# Patient Record
Sex: MALE | Race: WHITE | Employment: FULL TIME | URBAN - METROPOLITAN AREA
[De-identification: names, ages, dates, MRNs, and addresses within clinical notes are randomized per-mention and may not be internally consistent; named-entity substitution may affect disease eponyms.]

---

## 2020-08-19 ENCOUNTER — HOSPITAL ENCOUNTER (OUTPATIENT)
Age: 40
Discharge: HOME | End: 2020-08-19
Attending: EMERGENCY MEDICINE
Payer: COMMERCIAL

## 2020-08-19 VITALS
OXYGEN SATURATION: 98 % | HEART RATE: 59 BPM | SYSTOLIC BLOOD PRESSURE: 140 MMHG | HEIGHT: 67 IN | TEMPERATURE: 99 F | DIASTOLIC BLOOD PRESSURE: 80 MMHG | BODY MASS INDEX: 36.1 KG/M2 | RESPIRATION RATE: 18 BRPM | WEIGHT: 230 LBS

## 2020-08-19 DIAGNOSIS — S61.211A LACERATION OF LEFT INDEX FINGER WITHOUT FOREIGN BODY WITHOUT DAMAGE TO NAIL, INITIAL ENCOUNTER: Primary | ICD-10-CM

## 2020-08-19 PROCEDURE — 99202 OFFICE O/P NEW SF 15 MIN: CPT | Performed by: EMERGENCY MEDICINE

## 2020-08-19 PROCEDURE — 90471 IMMUNIZATION ADMIN: CPT | Performed by: EMERGENCY MEDICINE

## 2020-08-19 PROCEDURE — 12001 RPR S/N/AX/GEN/TRNK 2.5CM/<: CPT | Performed by: EMERGENCY MEDICINE

## 2020-08-19 PROCEDURE — 90715 TDAP VACCINE 7 YRS/> IM: CPT | Performed by: EMERGENCY MEDICINE

## 2020-08-20 NOTE — ED PROVIDER NOTES
Patient Seen in: [facility] [place]    History   No chief complaint on file.     Stated Complaint: Cut on L Index Finger    HPI    HPI: Demi Ford is a 38year old male who presents after an injury to left 2nd finger tip  t °C)   Temp src Temporal   SpO2 98 %   O2 Device None (Room air)       Current:/80   Pulse 59   Temp 98.5 °F (36.9 °C) (Temporal)   Resp 18   Ht 170.2 cm (5' 7\")   Wt 104.3 kg   SpO2 98%   BMI 36.02 kg/m²         Physical Exam      MENTAL STATUS: Lucy

## 2021-04-09 DIAGNOSIS — Z23 NEED FOR VACCINATION: ICD-10-CM

## 2021-08-09 ENCOUNTER — HOSPITAL ENCOUNTER (OUTPATIENT)
Age: 41
Discharge: HOME | End: 2021-08-09
Payer: COMMERCIAL

## 2021-08-09 ENCOUNTER — APPOINTMENT (OUTPATIENT)
Dept: GENERAL RADIOLOGY | Age: 41
End: 2021-08-09
Attending: NURSE PRACTITIONER
Payer: COMMERCIAL

## 2021-08-09 VITALS
RESPIRATION RATE: 16 BRPM | OXYGEN SATURATION: 100 % | HEART RATE: 58 BPM | DIASTOLIC BLOOD PRESSURE: 75 MMHG | TEMPERATURE: 98 F | WEIGHT: 235 LBS | HEIGHT: 67 IN | BODY MASS INDEX: 36.88 KG/M2 | SYSTOLIC BLOOD PRESSURE: 147 MMHG

## 2021-08-09 DIAGNOSIS — S99.921A INJURY OF RIGHT FOOT, INITIAL ENCOUNTER: Primary | ICD-10-CM

## 2021-08-09 DIAGNOSIS — S90.31XA CONTUSION OF RIGHT FOOT, INITIAL ENCOUNTER: ICD-10-CM

## 2021-08-09 PROCEDURE — 99213 OFFICE O/P EST LOW 20 MIN: CPT | Performed by: NURSE PRACTITIONER

## 2021-08-09 PROCEDURE — L3260 AMBULATORY SURGICAL BOOT EAC: HCPCS | Performed by: NURSE PRACTITIONER

## 2021-08-09 PROCEDURE — 73630 X-RAY EXAM OF FOOT: CPT | Performed by: NURSE PRACTITIONER

## 2021-08-09 NOTE — ED PROVIDER NOTES
Patient Seen in: [facility] [place]      History   Patient presents with:  Fracture, Minor: Missed a step and fell. Right foot is injured. There is bruising by the toes and bruising on the outside of the foot.  Hurts when walking. - Entered by patient Temp 97.9 °F (36.6 °C)   Temp src Temporal   SpO2 100 %   O2 Device None (Room air)       Current:/75   Pulse 58   Temp 97.9 °F (36.6 °C) (Temporal)   Resp 16   Ht 170.2 cm (5' 7\")   Wt 106.6 kg   SpO2 100%   BMI 36.81 kg/m²         Physical Exam  V discharge home to follow-up with the podiatrist.    X-ray negative. Patient and spouse made aware of results. Discussed application of postop shoe, rice therapy and over-the-counter ibuprofen and Tylenol for pain.   Discussed following up with the podiatr

## 2023-01-11 ENCOUNTER — TELEPHONE (OUTPATIENT)
Dept: OTHER | Age: 43
End: 2023-01-11

## 2023-01-11 NOTE — TELEPHONE ENCOUNTER
Reason for Call/Symptoms: Elevated blood pressure  Onset: a few months  Courtesy Assessment: Patient dealt with high blood pressure years ago. He had weight loss surgery and his b/p went to normal. He started on Ritalin 2 months ago and the last two visits, his blood pressure has been elevated 150/90 in December and 140/90 yesterday. He is asymptomatic. Level of care recommendation: Appt in Office - Same Day  Advice given to patient: Scheduled for tomorrow.      Future Appointments   Date Time Provider Ashok Jackman   1/12/2023 11:00 [facility]   1/20/2023  3:15 PM Ethan Faulkner MD [facility]

## 2023-01-12 ENCOUNTER — PATIENT MESSAGE (OUTPATIENT)
Dept: FAMILY MEDICINE CLINIC | Facility: CLINIC | Age: 43
End: 2023-01-12

## 2023-01-12 ENCOUNTER — OFFICE VISIT (OUTPATIENT)
Dept: FAMILY MEDICINE CLINIC | Facility: CLINIC | Age: 43
End: 2023-01-12

## 2023-01-12 VITALS
DIASTOLIC BLOOD PRESSURE: 82 MMHG | HEIGHT: 67 IN | HEART RATE: 70 BPM | BODY MASS INDEX: 33.59 KG/M2 | WEIGHT: 214 LBS | SYSTOLIC BLOOD PRESSURE: 106 MMHG

## 2023-01-12 DIAGNOSIS — E66.9 OBESITY (BMI 30.0-34.9): ICD-10-CM

## 2023-01-12 DIAGNOSIS — L71.9 ROSACEA: ICD-10-CM

## 2023-01-12 DIAGNOSIS — Z98.84 HISTORY OF WEIGHT LOSS SURGERY: ICD-10-CM

## 2023-01-12 DIAGNOSIS — F98.8 ATTENTION DEFICIT DISORDER (ADD) WITHOUT HYPERACTIVITY: ICD-10-CM

## 2023-01-12 DIAGNOSIS — Z00.00 WELL ADULT EXAM: Primary | ICD-10-CM

## 2023-01-12 DIAGNOSIS — I99.8 FLUCTUATING BLOOD PRESSURE: ICD-10-CM

## 2023-01-12 PROCEDURE — 3074F SYST BP LT 130 MM HG: CPT | Performed by: NURSE PRACTITIONER

## 2023-01-12 PROCEDURE — 3079F DIAST BP 80-89 MM HG: CPT | Performed by: NURSE PRACTITIONER

## 2023-01-12 PROCEDURE — 99203 OFFICE O/P NEW LOW 30 MIN: CPT | Performed by: NURSE PRACTITIONER

## 2023-01-12 PROCEDURE — 3008F BODY MASS INDEX DOCD: CPT | Performed by: NURSE PRACTITIONER

## 2023-01-12 PROCEDURE — 99386 PREV VISIT NEW AGE 40-64: CPT | Performed by: NURSE PRACTITIONER

## 2023-01-12 RX ORDER — DOXYCYCLINE 40 MG/1
CAPSULE ORAL
COMMUNITY

## 2023-01-12 RX ORDER — BLOOD PRESSURE TEST KIT
KIT MISCELLANEOUS
Qty: 1 KIT | Refills: 0 | Status: SHIPPED | OUTPATIENT
Start: 2023-01-12

## 2023-01-12 RX ORDER — METHYLPHENIDATE HYDROCHLORIDE 20 MG/1
TABLET ORAL
COMMUNITY
Start: 2022-10-01

## 2023-01-12 NOTE — ASSESSMENT & PLAN NOTE
Please aim to eat a diet high in fresh fruits and vegetables, lean protein sources, complex carbohydrates and limited processed and fast foods. Try to get at least 150 minutes of exercise per week-a combination of weight resistance and cardio is preferred.     Has h/o duodenal switch sx

## 2023-01-12 NOTE — ASSESSMENT & PLAN NOTE
Sees psychiatry  Is on ritalin 20 mg twice a day-they are looking to raise to 30 mg  Needs to monitor bp twice a day for the next 2 weeks and send in copy of results

## 2023-01-12 NOTE — ASSESSMENT & PLAN NOTE
Discussed w pt the need to take vitamins as prescribed by weight loss center  Should be seen at least yearly in follow up   Screening labs ordered

## 2023-01-13 NOTE — ASSESSMENT & PLAN NOTE
Screening labs ordered  Please aim to eat a diet high in fresh fruits and vegetables, lean protein sources, complex carbohydrates and limited processed and fast foods. Try to get at least 150 minutes of exercise per week-a combination of weight resistance and cardio is preferred.     Pt declines flu shot

## 2023-01-19 LAB
% SATURATION: 19 % (CALC) (ref 20–48)
ALBUMIN/GLOBULIN RATIO: 1.7 (CALC) (ref 1–2.5)
ALBUMIN: 4.1 G/DL (ref 3.6–5.1)
ALKALINE PHOSPHATASE: 144 U/L (ref 36–130)
ALT: 91 U/L (ref 9–46)
AST: 70 U/L (ref 10–40)
BILIRUBIN, TOTAL: 0.9 MG/DL (ref 0.2–1.2)
BUN: 17 MG/DL (ref 7–25)
CALCIUM: 9.1 MG/DL (ref 8.6–10.3)
CARBON DIOXIDE: 32 MMOL/L (ref 20–32)
CHLORIDE: 104 MMOL/L (ref 98–110)
CHOL/HDLC RATIO: 2.1 (CALC)
CHOLESTEROL, TOTAL: 94 MG/DL
CREATININE: 0.84 MG/DL (ref 0.6–1.29)
EGFR: 111 ML/MIN/1.73M2
FERRITIN: 25 NG/ML (ref 38–380)
GLOBULIN: 2.4 G/DL (CALC) (ref 1.9–3.7)
GLUCOSE: 80 MG/DL (ref 65–99)
HDL CHOLESTEROL: 45 MG/DL
HEMATOCRIT: 44.5 % (ref 38.5–50)
HEMOGLOBIN A1C: 4.4 % OF TOTAL HGB
HEMOGLOBIN: 14.7 G/DL (ref 13.2–17.1)
IRON BINDING CAPACITY: 320 MCG/DL (CALC) (ref 250–425)
IRON, TOTAL: 62 MCG/DL (ref 50–180)
LDL-CHOLESTEROL: 33 MG/DL (CALC)
MAGNESIUM: 2.1 MG/DL (ref 1.5–2.5)
MCH: 29 PG (ref 27–33)
MCHC: 33 G/DL (ref 32–36)
MCV: 87.8 FL (ref 80–100)
MPV: 12.7 FL (ref 7.5–12.5)
NON-HDL CHOLESTEROL: 49 MG/DL (CALC)
PLATELET COUNT: 163 THOUSAND/UL (ref 140–400)
POTASSIUM: 5 MMOL/L (ref 3.5–5.3)
PROTEIN, TOTAL: 6.5 G/DL (ref 6.1–8.1)
RDW: 12.9 % (ref 11–15)
RED BLOOD CELL COUNT: 5.07 MILLION/UL (ref 4.2–5.8)
SODIUM: 139 MMOL/L (ref 135–146)
TRIGLYCERIDES: 75 MG/DL
TSH W/REFLEX TO FT4: 3.71 MIU/L (ref 0.4–4.5)
VITAMIN B12: 1572 PG/ML (ref 200–1100)
VITAMIN B6: 14.1 NG/ML (ref 2.1–21.7)
VITAMIN D, 25-OH, TOTAL: 18 NG/ML (ref 30–100)
WHITE BLOOD CELL COUNT: 5.9 THOUSAND/UL (ref 3.8–10.8)

## 2023-01-22 DIAGNOSIS — R74.8 ELEVATED LIVER ENZYMES: Primary | ICD-10-CM

## 2023-01-28 ENCOUNTER — PATIENT MESSAGE (OUTPATIENT)
Dept: FAMILY MEDICINE CLINIC | Facility: CLINIC | Age: 43
End: 2023-01-28

## 2023-01-28 NOTE — TELEPHONE ENCOUNTER
Collin Hubbard RN 1/28/2023 10:14 AM CST        ----- Message -----  From: Rita Elena  Sent: 1/28/2023 8:16 AM CST  To: [facility] Rn Triage  Subject: Blood Pressure Readings     At my previous appointment, you wanted me to keep track of my blood pressure readings. Attached is a pdf breakout. Let me know if I need to schedule a follow up appointment.

## 2023-05-18 ENCOUNTER — HOSPITAL ENCOUNTER (OUTPATIENT)
Age: 43
Discharge: HOME | End: 2023-05-18
Payer: COMMERCIAL

## 2023-05-18 ENCOUNTER — APPOINTMENT (OUTPATIENT)
Dept: GENERAL RADIOLOGY | Age: 43
End: 2023-05-18
Payer: COMMERCIAL

## 2023-05-18 VITALS
RESPIRATION RATE: 16 BRPM | HEART RATE: 67 BPM | TEMPERATURE: 98 F | SYSTOLIC BLOOD PRESSURE: 132 MMHG | DIASTOLIC BLOOD PRESSURE: 78 MMHG | OXYGEN SATURATION: 100 %

## 2023-05-18 DIAGNOSIS — M79.641 RIGHT HAND PAIN: Primary | ICD-10-CM

## 2023-05-18 DIAGNOSIS — S63.91XA SPRAIN OF RIGHT HAND, INITIAL ENCOUNTER: ICD-10-CM

## 2023-05-18 PROCEDURE — 73130 X-RAY EXAM OF HAND: CPT

## 2023-05-18 NOTE — DISCHARGE INSTRUCTIONS
There is no fracture on your x-ray. You likely have a sprain of your hand. Rest, ice and elevate. Take Tylenol and Motrin for pain as needed. If you develop any numbness, tingling, acutely worsening pain, swelling or any other concerning complaints you should go to the emergency department. If you have persistent pain for more than the next week make an appointment to see the orthopedic hand specialist.  Otherwise follow-up with your primary care doctor. Your blood pressure was elevated today. You should monitor your blood pressure at home and keep a journal of your readings. Please be sure to make an appointment to follow-up with your primary care doctor to discuss this further.

## 2023-05-18 NOTE — ED INITIAL ASSESSMENT (HPI)
Pt in [address] [place] [address] for c/o R hand pain/swelling to dorsum of hand after punching wall this morning.

## 2023-12-14 ENCOUNTER — OFFICE VISIT (OUTPATIENT)
Dept: FAMILY MEDICINE CLINIC | Facility: CLINIC | Age: 43
End: 2023-12-14
Payer: COMMERCIAL

## 2023-12-14 VITALS
DIASTOLIC BLOOD PRESSURE: 82 MMHG | RESPIRATION RATE: 18 BRPM | SYSTOLIC BLOOD PRESSURE: 134 MMHG | OXYGEN SATURATION: 98 % | HEART RATE: 59 BPM | TEMPERATURE: 99 F

## 2023-12-14 DIAGNOSIS — J02.9 SORE THROAT: Primary | ICD-10-CM

## 2023-12-14 DIAGNOSIS — Z11.52 ENCOUNTER FOR SCREENING LABORATORY TESTING FOR COVID-19 VIRUS: ICD-10-CM

## 2023-12-14 LAB
CONTROL LINE PRESENT WITH A CLEAR BACKGROUND (YES/NO): YES YES/NO
KIT LOT #: NORMAL NUMERIC
OPERATOR ID: NORMAL
POCT LOT NUMBER: NORMAL
RAPID SARS-COV-2 BY PCR: NOT DETECTED

## 2023-12-14 PROCEDURE — 99213 OFFICE O/P EST LOW 20 MIN: CPT | Performed by: NURSE PRACTITIONER

## 2023-12-14 PROCEDURE — 3079F DIAST BP 80-89 MM HG: CPT | Performed by: NURSE PRACTITIONER

## 2023-12-14 PROCEDURE — U0002 COVID-19 LAB TEST NON-CDC: HCPCS | Performed by: NURSE PRACTITIONER

## 2023-12-14 PROCEDURE — 87880 STREP A ASSAY W/OPTIC: CPT | Performed by: NURSE PRACTITIONER

## 2023-12-14 PROCEDURE — 3075F SYST BP GE 130 - 139MM HG: CPT | Performed by: NURSE PRACTITIONER

## 2024-03-20 ENCOUNTER — HOSPITAL ENCOUNTER (OUTPATIENT)
Dept: GENERAL RADIOLOGY | Age: 44
Discharge: HOME | End: 2024-03-20
Attending: NURSE PRACTITIONER
Payer: COMMERCIAL

## 2024-03-20 ENCOUNTER — OFFICE VISIT (OUTPATIENT)
Dept: FAMILY MEDICINE CLINIC | Facility: CLINIC | Age: 44
End: 2024-03-20
Payer: COMMERCIAL

## 2024-03-20 VITALS
DIASTOLIC BLOOD PRESSURE: 88 MMHG | HEART RATE: 52 BPM | SYSTOLIC BLOOD PRESSURE: 131 MMHG | WEIGHT: 195 LBS | BODY MASS INDEX: 31 KG/M2

## 2024-03-20 DIAGNOSIS — M16.0 OSTEOARTHRITIS OF BOTH HIPS, UNSPECIFIED OSTEOARTHRITIS TYPE: Primary | ICD-10-CM

## 2024-03-20 DIAGNOSIS — M25.551 RIGHT HIP PAIN: ICD-10-CM

## 2024-03-20 DIAGNOSIS — M25.551 RIGHT HIP PAIN: Primary | ICD-10-CM

## 2024-03-20 PROBLEM — Z98.84 H/O GASTRIC BYPASS: Status: ACTIVE | Noted: 2023-01-12

## 2024-03-20 PROCEDURE — 99214 OFFICE O/P EST MOD 30 MIN: CPT | Performed by: NURSE PRACTITIONER

## 2024-03-20 PROCEDURE — 3079F DIAST BP 80-89 MM HG: CPT | Performed by: NURSE PRACTITIONER

## 2024-03-20 PROCEDURE — 73502 X-RAY EXAM HIP UNI 2-3 VIEWS: CPT | Performed by: NURSE PRACTITIONER

## 2024-03-20 PROCEDURE — 3075F SYST BP GE 130 - 139MM HG: CPT | Performed by: NURSE PRACTITIONER

## 2024-03-20 NOTE — ASSESSMENT & PLAN NOTE
Tylenol as directed for pain  Ice 20 min 4-6 times per day  Do not use heat on injury  Do not apply ice directly on skin, make certain a thin cloth is between skin and ice pack    Right hip xray  Please call if symptoms worsen or are not resolving.

## 2024-03-20 NOTE — PROGRESS NOTES
Hip Pain         Pt here for increasing right hip pain-not certain as to when pain started. Thinks it started a couple of months ago.   Has been walking w his new puppy and be active everyday.  Has not pain w running. Pain worsens once he sits down and then has to get up.   Hurts when he gets out bed. Has stabbing pain to right lateral buttocks. Once he is up and moving it gets better. Ibuprofen will help pain but tries not to take every day.     Checks bp 121/81 is avg.   Review of Systems   Constitutional:  Positive for activity change.   Musculoskeletal:  Positive for arthralgias and gait problem.       Vitals:    03/20/24 1149 03/20/24 1200   BP: (!) 135/93 131/88   Pulse: 52    Weight: 195 lb (88.5 kg)      Body mass index is 30.54 kg/m².  Wt Readings from Last 6 Encounters:   03/20/24 195 lb (88.5 kg)   01/12/23 214 lb (97.1 kg)   08/09/21 235 lb (106.6 kg)   08/19/20 230 lb (104.3 kg)   08/15/18 240 lb 4.8 oz (109 kg)   11/19/15 231 lb 6.4 oz (105 kg)         Health Maintenance   Topic Date Due    Colorectal Cancer Screening  Never done    COVID-19 Vaccine (1 - 2023-24 season) Never done    Influenza Vaccine (1) 10/01/2023    Annual Depression Screening  Never done    Annual Physical  01/12/2024    DTaP,Tdap,and Td Vaccines (2 - Td or Tdap) 08/19/2030    Pneumococcal Vaccine: Birth to 64yrs  Aged Out       Past Medical History:   Diagnosis Date    Attention deficit disorder without mention of hyperactivity     Calculus of gallbladder without mention of cholecystitis or obstruction     History of bariatric surgery     Hyperglycemia 1/20/2011    on fasting bloodwork    Hypertriglyceridemia 4/29/2011    Lipid screening 12/16/2010    Morbid obesity (HCC) 2011    Rosacea 2006    Unspecified essential hypertension        .  Past Surgical History:   Procedure Laterality Date    Cholecystectomy      Gastroplasty duodenal switch      Hand/finger surgery unlisted  01/01/1994    Laparoscopic cholecystectomy  02/14/2008     Middle ear surgery proc unlisted  as a child    tubes in ears-    Tonsillectomy  01/01/1988    w/denys       Family History   Problem Relation Age of Onset    Other (HTN[other]) Mother     Hypertension Mother     Heart Attack Maternal Grandmother        Social History     Socioeconomic History    Marital status:      Spouse name: Not on file    Number of children: Not on file    Years of education: Not on file    Highest education level: Not on file   Occupational History    Not on file   Tobacco Use    Smoking status: Never    Smokeless tobacco: Never   Vaping Use    Vaping Use: Never used   Substance and Sexual Activity    Alcohol use: No     Alcohol/week: 0.0 standard drinks of alcohol    Drug use: No    Sexual activity: Not on file   Other Topics Concern     Service Not Asked    Blood Transfusions Not Asked    Caffeine Concern Yes     Comment: Coffee; Eneregy drinks    Occupational Exposure Not Asked    Hobby Hazards Not Asked    Sleep Concern Not Asked    Stress Concern Not Asked    Weight Concern Not Asked    Special Diet Not Asked    Back Care Not Asked    Exercise Yes     Comment: walks    Bike Helmet Not Asked    Seat Belt Not Asked    Self-Exams Not Asked   Social History Narrative    Not on file     Social Determinants of Health     Financial Resource Strain: Not on file   Food Insecurity: Not on file   Transportation Needs: Not on file   Physical Activity: Not on file   Stress: Not on file   Social Connections: Not on file   Housing Stability: Not on file       Current Outpatient Medications   Medication Sig Dispense Refill    methylphenidate 20 MG Oral Tab          Allergies:  No Known Allergies    Physical Exam  Vitals and nursing note reviewed.   Constitutional:       General: He is not in acute distress.     Appearance: Normal appearance. He is normal weight.   Cardiovascular:      Rate and Rhythm: Normal rate and regular rhythm.      Heart sounds: Normal heart sounds.    Pulmonary:      Effort: Pulmonary effort is normal. No respiratory distress.      Breath sounds: Normal breath sounds.   Musculoskeletal:         General: Tenderness present.      Right hip: Tenderness and bony tenderness present. Decreased range of motion.        Legs:    Skin:     General: Skin is warm and dry.   Neurological:      Mental Status: He is alert.         Assessment and Plan:   Problem List Items Addressed This Visit       Right hip pain - Primary     Tylenol as directed for pain  Ice 20 min 4-6 times per day  Do not use heat on injury  Do not apply ice directly on skin, make certain a thin cloth is between skin and ice pack    Right hip xray  Please call if symptoms worsen or are not resolving.           Relevant Orders    XR HIP + PELVIS MIN 4 VIEWS RIGHT (CPT=73503)               Discussed plan of care with pt and pt is in agreement.All questions answered. Pt to call with questions or concerns.      Encouraged to sign up for My Chart if not already registered.

## 2024-04-17 ENCOUNTER — OFFICE VISIT (OUTPATIENT)
Dept: ORTHOPEDICS CLINIC | Facility: CLINIC | Age: 44
End: 2024-04-17
Payer: COMMERCIAL

## 2024-04-17 DIAGNOSIS — M16.11 PRIMARY OSTEOARTHRITIS OF RIGHT HIP: Primary | ICD-10-CM

## 2024-04-17 PROCEDURE — 99244 OFF/OP CNSLTJ NEW/EST MOD 40: CPT | Performed by: ORTHOPAEDIC SURGERY

## 2024-04-18 NOTE — PROGRESS NOTES
NURSING INTAKE COMMENTS:   Chief Complaint   Patient presents with    Hip Pain     Consult - Referred by Aishwarya Mendiola APRN for Right hip pain. Patient states that he has been having this pain for a couple of years. The pain started to get worst about 6 months ago. Last X-Rays were done on 3/20. Patient rates his pain 7/10 at this time.           HPI: This 44 year old male presents today with complaints of right hip and groin pain.  He feels pain mainly over the lateral aspect of the hip.  He is had progressive symptoms over the past 6 to 8 months.  He is had minor symptoms in the hip for the past few years.  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  He has been taking Advil and using IcyHot patches with minimal improvement.  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  There is been no history of injury to the hip.  Pain is worse when walking distances.  He is had a considerable weight loss.  At 1 point he weighed 400 pounds and currently weighs 190 pounds.  He had weight loss surgery.  He reports no mechanical clicking or popping in the hip.  There is no significant back pain or numbness in the extremity.  He has occasional knee pain.    Past Medical History:    Attention deficit disorder without mention of hyperactivity    Calculus of gallbladder without mention of cholecystitis or obstruction    History of bariatric surgery    Hyperglycemia    on fasting bloodwork    Hypertriglyceridemia    Lipid screening    Morbid obesity (HCC)    Rosacea    Unspecified essential hypertension     Past Surgical History:   Procedure Laterality Date    Cholecystectomy      Gastroplasty duodenal switch      Hand/finger surgery unlisted  01/01/1994    Laparoscopic cholecystectomy  02/14/2008    Middle ear surgery proc unlisted  as a child    tubes in ears-    Tonsillectomy  01/01/1988    w/adnoids     Current Outpatient Medications   Medication Sig Dispense Refill    ORACEA 40 MG Oral Capsule Delayed  Release Take 1 capsule by mouth daily.      methylphenidate 20 MG Oral Tab        No Known Allergies  Family History   Problem Relation Age of Onset    Other (HTN[other]) Mother     Hypertension Mother     Heart Attack Maternal Grandmother        Social History     Occupational History    Not on file   Tobacco Use    Smoking status: Never    Smokeless tobacco: Never   Vaping Use    Vaping status: Never Used   Substance and Sexual Activity    Alcohol use: No     Alcohol/week: 0.0 standard drinks of alcohol    Drug use: No    Sexual activity: Not on file        Review of Systems:  GENERAL: denies fevers, chills, night sweats, fatigue, unintentional weight loss/gain  SKIN: denies skin lesions, open sores, rash  HEENT:denies recent vision change, new nasal congestion,hearing loss, tinnitus, sore throat, headaches  RESPIRATORY: denies new shortness of breath, cough, asthma, wheezing  CARDIOVASCULAR: denies chest pain, leg cramps with exertion, palpitations, leg swelling  GI: denies abdominal pain, nausea, vomiting, diarrhea, constipation, hematochezia, worsening heartburn or stomach ulcers  : denies dysuria, hematuria, incontinence, increased frequency, urgency, difficulty urinating  MUSCULOSKELETAL: denies musculoskeletal complaints other than in HPI  NEURO: denies numbness, tingling, weakness, balance issues, dizziness, memory loss  PSYCHIATRIC: denies Hx of depression, anxiety, other psychiatric disorders  HEMATOLOGIC: denies blood clots, anemia, blood clotting disorders, blood transfusion  ENDOCRINE: denies autoimmune disease, thyroid issues, or diabetes  ALLERGY: denies asthma, seasonal allergies    Physical Examination:    There were no vitals taken for this visit.  Constitutional: appears well hydrated, alert and responsive, no acute distress noted  Extremities: He walks with slight antalgia on the right.  Further exam the right hip reveals no significant tenderness over the greater trochanter or sciatic notch.   ASIS is nontender.  He is able to actively straight leg raise with some pain in the hip area.  Passive flexion is to 90 degrees with pain.  Passive internal rotation is to 30 degrees with significant pain.  Passive external rotation is to 70 degrees with pain.  Abduction strength 4 out of 5.  Right knee unremarkable with no effusion.  Neurological: Light touch and pinprick sensation intact throughout the lower extremities.  Ankle dorsiflexion plantarflexion EHL knee extension and hip flexion strength are 5 out of 5 bilaterally.  No clonus.    Imaging:   XR HIP W OR WO PELVIS 2 OR 3 VIEWS, RIGHT (CPT=73502)    Result Date: 3/20/2024  PROCEDURE: XR HIP W OR WO PELVIS 2 OR 3 VIEWS, RIGHT (CPT=73502)  COMPARISON: None.  INDICATIONS: Chronic right hip pain.  TECHNIQUE: 3 views were obtained.   FINDINGS:  BONES: There is no fracture or dislocation.  There is moderate hip joint space narrowing, right greater than left, with moderate marginal osteophyte and subchondral cyst formation bilaterally.  The pubic symphysis and sacroiliac joints are intact, as is the visualized lower lumbar spine. SOFT TISSUES: Negative. No visible soft tissue swelling. EFFUSION: None visible. OTHER: Bowel wall staples are seen in the left hemipelvis.         CONCLUSION:  Moderate to severe hip joint osteoarthritis, right greater than left, which is greater than would be expected for the patient's age.    Dictated by (CST): Koffi Fairchild MD on 3/20/2024 at 2:24 PM     Finalized by (CST): Koffi Fairchild MD on 3/20/2024 at 2:26 PM             Labs:  Lab Results   Component Value Date    WBC 5.9 01/16/2023    HGB 14.7 01/16/2023     01/16/2023      Lab Results   Component Value Date    GLU 80 01/16/2023    BUN 17 01/16/2023    CREATSERUM 0.84 01/16/2023        Assessment and Plan:  Diagnoses and all orders for this visit:    Primary osteoarthritis of right hip  -     PHYSICAL THERAPY - INTERNAL  -     XR ASPIR/INJ MAJOR JOINT W/FLUORO  RT(CPT=77002/48243); Future        Assessment: Right hip osteoarthritis, primary    Plan: Given his young age, I was reluctant to recommend any type of surgical treatment at this time.  Advised icing and oral anti-inflammatory use.  Ordered Kenalog injection to be administered by the radiology service into the right hip joint.  Provided referral for outpatient physical therapy.  Suggested strengthening exercises for the hip abductors.  Follow-up again in 3 months.    Follow Up: Return in about 3 months (around 7/17/2024).    KATH PATEL MD

## 2024-04-24 ENCOUNTER — HOSPITAL ENCOUNTER (OUTPATIENT)
Dept: GENERAL RADIOLOGY | Facility: HOSPITAL | Age: 44
Discharge: HOME | End: 2024-04-24
Attending: ORTHOPAEDIC SURGERY
Payer: COMMERCIAL

## 2024-04-24 DIAGNOSIS — M16.11 PRIMARY OSTEOARTHRITIS OF RIGHT HIP: ICD-10-CM

## 2024-04-24 PROCEDURE — 20610 DRAIN/INJ JOINT/BURSA W/O US: CPT | Performed by: ORTHOPAEDIC SURGERY

## 2024-04-24 PROCEDURE — 77002 NEEDLE LOCALIZATION BY XRAY: CPT | Performed by: ORTHOPAEDIC SURGERY

## 2024-04-24 RX ORDER — TRIAMCINOLONE ACETONIDE 40 MG/ML
INJECTION, SUSPENSION INTRA-ARTICULAR; INTRAMUSCULAR
Status: COMPLETED
Start: 2024-04-24 | End: 2024-04-24

## 2024-04-24 RX ORDER — IOPAMIDOL 612 MG/ML
15 INJECTION, SOLUTION INTRATHECAL
Status: COMPLETED | OUTPATIENT
Start: 2024-04-24 | End: 2024-04-24

## 2024-04-24 RX ORDER — LIDOCAINE HYDROCHLORIDE 20 MG/ML
5 INJECTION, SOLUTION EPIDURAL; INFILTRATION; INTRACAUDAL; PERINEURAL ONCE
Status: COMPLETED | OUTPATIENT
Start: 2024-04-24 | End: 2024-04-24

## 2024-05-30 ENCOUNTER — TELEPHONE (OUTPATIENT)
Dept: PHYSICAL THERAPY | Facility: HOSPITAL | Age: 44
End: 2024-05-30

## 2024-06-03 ENCOUNTER — OFFICE VISIT (OUTPATIENT)
Dept: PHYSICAL THERAPY | Age: 44
End: 2024-06-03
Attending: ORTHOPAEDIC SURGERY
Payer: COMMERCIAL

## 2024-06-03 DIAGNOSIS — M25.551 RIGHT HIP PAIN: Primary | ICD-10-CM

## 2024-06-03 PROCEDURE — 97530 THERAPEUTIC ACTIVITIES: CPT

## 2024-06-03 PROCEDURE — 97110 THERAPEUTIC EXERCISES: CPT

## 2024-06-03 PROCEDURE — 97161 PT EVAL LOW COMPLEX 20 MIN: CPT

## 2024-06-03 NOTE — PROGRESS NOTES
LOWER EXTREMITY EVALUATION:     Diagnosis:   Right hip pain      Referring Provider: Joseph Arenas  Date of Evaluation:    6/3/2024    Precautions:   Stomach Sleeve Next MD visit:   none scheduled  Date of Surgery: n/a     PATIENT SUMMARY   Noam Pride is a 44 year old male who presents to therapy today with complaints of right hip pain for many years.  Pt describes pain level current 1-2/10, at best 0/10, at worst 4/10.   Current functional limitations include wb activities.     Noam describes prior level of function was independent with his ADLs and IADLs. Pt goals include decreasing his right hip pain.  Past medical history was reviewed with Noam. Significant findings include x-rays were positive for right hip oa    ASSESSMENT  Noam presents to physical therapy evaluation with primary c/o right hip pain. The results of the objective tests and measures show decreased rom, tightness and weakness.  Functional deficits include but are not limited to wb act.  Signs and symptoms are consistent with diagnosis of right hip oa. Pt and PT discussed evaluation findings, pathology, POC and HEP.  Pt voiced understanding and performs HEP correctly without reported pain. Skilled Physical Therapy is medically necessary to address the above impairments and reach functional goals.     OBJECTIVE:   Observation: Right trunk lean, hip er and pronation  Palpation: None.  Sensation: Intact sujey.    AROM: (* denotes performed with pain)  Hip   Flexion: R 93; L 100  Abduction: R 25; L 46       Accessory motion: Right hip hypomobility in all directions; +2 grades.    Flexibility:  Hip Flexor: R Tighter, L Tight  Hamstrings: R Tighter; L Tight  Piriformis: R Tighter; L Tight  Quads: R Tighter; L Tight  Gastroc-soleus: R Tighter; L Tight    Strength/MMT: (* denotes performed with pain)  Hip Knee   Flexion: R 4/5; L 4+/5  Extension: R 4/5; L 4+/5  Abduction: R 4/5; L 4+/5  ER: R 4/5; L 4+/5  IR: R 4/5; L 4+/5 Flexion: R 4/5; L  4+/5  Extension: R 4/5; L 4+/5        Special tests:   Positive right scour's test. Negative at the left. Also negative for eloy. [place] test.    Gait: pt ambulates on level ground with antalgia and right trunk lean.  Balance: SLS: R 20 sec, L 30 sec    Today’s Treatment and Response:   Pt education was provided on exam findings, treatment diagnosis, treatment plan, expectations, and prognosis. Pt was also provided recommendations for activity modifications, ergonomics, and pain science education .  Patient was instructed in and issued a HEP for: Ther. Ex. For 10'- Eloy. SLR 20xea.          S/L Abd. 20xea.          Prone Hip Ext. 20xea.          Ther. Act. For 10'- Discussed his condition, pt expectations and prognosis.    Charges: 1PT Eval Low Complexity, 1TE and 1TA      Total Timed Treatment: 20 min     Total Treatment Time: 45 min     Based on clinical rationale and outcome measures, this evaluation involved Low Complexity decision making.    PLAN OF CARE:    Goals: (to be met in 12 visits)  .Pt will have improved hip AROM Flex to 100 deg and ABD to 45 deg to be able to don/doff shoes and perform car transfers without difficulty   Pt will improve hip ABD and ER strength to 5/5 to increase ease with standing and walking   Pt will be able to squat to  light objects around the house with <1/10 hip pain   Pt will improve functional hip strength to report ability to ascend/descend 1 flight of stairs reciprocally without use of handrail   Pt will demonstrate improved SLS to >30 seconds ELOY to promote safety and decrease risk of falls on uneven surfaces such as grass and gravel   Pt will be independent and compliant with comprehensive HEP to maintain progress achieved in PT      Frequency / Duration: Patient will be seen for 2 x/week or a total of 12 visits over a 90 day period. Treatment will include: Gait training, Manual Therapy, Neuromuscular Re-education, Therapeutic Activities, Therapeutic Exercise, Home  Exercise Program instruction, and Modalities to include: Electrical stimulation (unattended)    Education or treatment limitation: None  Rehab Potential:fair    LEFS Score  LEFS Score: 81.25 % (6/3/2024  1:41 PM)      Patient/Family/Caregiver was advised of these findings, precautions, and treatment options and has agreed to actively participate in planning and for this course of care.    Thank you for your referral. Please co-sign or sign and return this letter via fax as soon as possible to [phone]. If you have any questions, please contact me at Dept: [phone]    Sincerely,  Electronically signed by therapist: Dashawn Quintana, PT, DPT, CSCS  Physician's certification required: Yes  I certify the need for these services furnished under this plan of treatment and while under my care.    X___________________________________________________ Date____________________    Certification From: 6/3/2024  To:9/1/2024

## 2024-06-07 ENCOUNTER — OFFICE VISIT (OUTPATIENT)
Dept: PHYSICAL THERAPY | Age: 44
End: 2024-06-07
Attending: ORTHOPAEDIC SURGERY
Payer: COMMERCIAL

## 2024-06-07 DIAGNOSIS — M25.551 RIGHT HIP PAIN: Primary | ICD-10-CM

## 2024-06-07 PROCEDURE — 97110 THERAPEUTIC EXERCISES: CPT

## 2024-06-07 PROCEDURE — 97112 NEUROMUSCULAR REEDUCATION: CPT

## 2024-06-07 NOTE — PROGRESS NOTES
Diagnosis:   Right hip pain      Referring Provider: Joseph Arenas  Date of Evaluation:    6/7/24    Precautions:   Stomach Sleeve Next MD visit:   none scheduled  Date of Surgery: n/a   Insurance Primary/Secondary: [employer] OUT OF STATE / N/A     # Auth Visits: 12            Subjective: Patient reports that he has no pain this am. He has been doing his hep and felt ok after his initial eval.    Pain: 0/10      Objective:   Observation: Right trunk lean, hip er and pronation  Palpation: None.  Sensation: Intact sujey.     AROM: (* denotes performed with pain)  Hip   Flexion: R 93; L 100  Abduction: R 25; L 46         Accessory motion: Right hip hypomobility in all directions; +2 grades.     Flexibility:  Hip Flexor: R Tighter, L Tight  Hamstrings: R Tighter; L Tight  Piriformis: R Tighter; L Tight  Quads: R Tighter; L Tight  Gastroc-soleus: R Tighter; L Tight     Strength/MMT: (* denotes performed with pain)  Hip Knee   Flexion: R 4/5; L 4+/5  Extension: R 4/5; L 4+/5  Abduction: R 4/5; L 4+/5  ER: R 4/5; L 4+/5  IR: R 4/5; L 4+/5 Flexion: R 4/5; L 4+/5  Extension: R 4/5; L 4+/5         Special tests:   Positive right scour's test. Negative at the left. Also negative for sujey. [place] test.     Gait: pt ambulates on level ground with antalgia and right trunk lean.  Balance: SLS: R 20 sec, L 30 sec      Assessment: Patient presents with right hip tightness and guarding during his manual and self stretches. The patient also presents with right le weakness during his table and wb strengthening exercises.      Goals: (to be met in 12 visits)  .Pt will have improved hip AROM Flex to 100 deg and ABD to 45 deg to be able to don/doff shoes and perform car transfers without difficulty   Pt will improve hip ABD and ER strength to 5/5 to increase ease with standing and walking   Pt will be able to squat to  light objects around the house with <1/10 hip pain   Pt will improve functional hip strength to report ability to  ascend/descend 1 flight of stairs reciprocally without use of handrail   Pt will demonstrate improved SLS to >30 seconds ARELY to promote safety and decrease risk of falls on uneven surfaces such as grass and gravel   Pt will be independent and compliant with comprehensive HEP to maintain progress achieved in PT      Plan: Plan to work on stretching,   Date: 6/7/2024  TX#: 2/12 Date:                 TX#: 3/ Date:                 TX#: 4/ Date:                 TX#: 5/ Date:   Tx#: 6/   Ther. Ex.: 30'  Nu-Step 8' L5  Bilateral HS, glut., pfs, hf and skc st. 4x30\"xea.  SLR 20xea,  S/L Abd. 20xea,  Prone Hip Ext. 20xea.  Groin St. 3x30\"       Neuro Re-ed.: 15'  Bolster Squeeze 20x3\"  Bridges 2x20  Side steps 5 Laps  Hip Swings Forward and Lateral       HEP: Reviewed his original HEP.    Charges: 2TE and 1Neuro       Total Timed Treatment: 45 min  Total Treatment Time: 45 min

## 2024-06-10 ENCOUNTER — OFFICE VISIT (OUTPATIENT)
Dept: PHYSICAL THERAPY | Age: 44
End: 2024-06-10
Attending: ORTHOPAEDIC SURGERY
Payer: COMMERCIAL

## 2024-06-10 DIAGNOSIS — M25.551 RIGHT HIP PAIN: Primary | ICD-10-CM

## 2024-06-10 PROCEDURE — 97110 THERAPEUTIC EXERCISES: CPT

## 2024-06-10 NOTE — PROGRESS NOTES
Diagnosis:   Right hip pain      Referring Provider: Joseph Arenas  Date of Evaluation:    6/7/24    Precautions:   Stomach Sleeve Next MD visit:   none scheduled  Date of Surgery: n/a   Insurance Primary/Secondary: BCBS OUT OF STATE / N/A     # Auth Visits: 12            Subjective: Patient reports that he has right hip pain this afternoon. Patient was 15 minutes late. He has been doing his hep and felt ok after his last visit.    Pain: 3-4/10      Objective:   Observation: Right trunk lean, hip er and pronation  Palpation: None.  Sensation: Intact sujey.     AROM: (* denotes performed with pain)  Hip   Flexion: R 93; L 100  Abduction: R 25; L 46         Accessory motion: Right hip hypomobility in all directions; +2 grades.     Flexibility:  Hip Flexor: R Tighter, L Tight  Hamstrings: R Tighter; L Tight  Piriformis: R Tighter; L Tight  Quads: R Tighter; L Tight  Gastroc-soleus: R Tighter; L Tight     Strength/MMT: (* denotes performed with pain)  Hip Knee   Flexion: R 4/5; L 4+/5  Extension: R 4/5; L 4+/5  Abduction: R 4/5; L 4+/5  ER: R 4/5; L 4+/5  IR: R 4/5; L 4+/5 Flexion: R 4/5; L 4+/5  Extension: R 4/5; L 4+/5         Special tests:   Positive right scour's test. Negative at the left. Also negative for sujey. [place] test.     Gait: pt ambulates on level ground with antalgia and right trunk lean.  Balance: SLS: R 20 sec, L 30 sec      Assessment: Patient presents with right hip tightness and guarding during his manual and self stretches. The patient also presents with right le weakness during his table exercises.      Goals: (to be met in 12 visits)  .Pt will have improved hip AROM Flex to 100 deg and ABD to 45 deg to be able to don/doff shoes and perform car transfers without difficulty   Pt will improve hip ABD and ER strength to 5/5 to increase ease with standing and walking   Pt will be able to squat to  light objects around the house with <1/10 hip pain   Pt will improve functional hip strength  to report ability to ascend/descend 1 flight of stairs reciprocally without use of handrail   Pt will demonstrate improved SLS to >30 seconds ARELY to promote safety and decrease risk of falls on uneven surfaces such as grass and gravel   Pt will be independent and compliant with comprehensive HEP to maintain progress achieved in PT      Plan: Plan to work on stretching,   Date: 6/7/2024  TX#: 2/12 Date:  6/10/24               TX#: 3/12 Date:                 TX#: 4/ Date:                 TX#: 5/ Date:   Tx#: 6/   Ther. Ex.: 30'  Nu-Step 8' L5  Bilateral HS, glut., pfs, hf and skc st. 4x30\"xea.  SLR 20xea,  S/L Abd. 20xea,  Prone Hip Ext. 20xea.  Groin St. 3x30\" Ther. Ex.: 30'  Nu-Step 8' L5  Bilateral HS, glut., pfs, hf and skc st. 4x30\"xea.  Groin St. 3x30\"  Tennis Ball Self Mob. 3'      Neuro Re-ed.: 15'  Bolster Squeeze 20x3\"  Bridges 2x20  Side steps 5 Laps  Hip Swings Forward and Lateral 20xea.       HEP: Did not add any new exercises to his HEP.    Charges: 2TE      Total Timed Treatment: 3 min  Total Treatment Time: 30 min (15 minutes late)

## 2024-06-14 ENCOUNTER — OFFICE VISIT (OUTPATIENT)
Dept: PHYSICAL THERAPY | Age: 44
End: 2024-06-14
Attending: ORTHOPAEDIC SURGERY
Payer: COMMERCIAL

## 2024-06-14 DIAGNOSIS — M25.551 RIGHT HIP PAIN: Primary | ICD-10-CM

## 2024-06-14 PROCEDURE — 97112 NEUROMUSCULAR REEDUCATION: CPT

## 2024-06-14 PROCEDURE — 97110 THERAPEUTIC EXERCISES: CPT

## 2024-06-14 NOTE — PROGRESS NOTES
Diagnosis:   Right hip pain      Referring Provider: Joseph Arenas  Date of Evaluation:    6/7/24    Precautions:   Stomach Sleeve Next MD visit:   none scheduled  Date of Surgery: n/a   Insurance Primary/Secondary: BCBS OUT OF STATE / N/A     # Auth Visits: 12            Subjective: Patient reports that he has right hip pain this a. He has been doing his hep and felt ok after his last visit.    Pain: 4/10      Objective:   Observation: Right trunk lean, hip er and pronation  Palpation: None.  Sensation: Intact sujey.     AROM: (* denotes performed with pain)  Hip   Flexion: R 93; L 100  Abduction: R 25; L 46         Accessory motion: Right hip hypomobility in all directions; +2 grades.     Flexibility:  Hip Flexor: R Tighter, L Tight  Hamstrings: R Tighter; L Tight  Piriformis: R Tighter; L Tight  Quads: R Tighter; L Tight  Gastroc-soleus: R Tighter; L Tight     Strength/MMT: (* denotes performed with pain)  Hip Knee   Flexion: R 4/5; L 4+/5  Extension: R 4/5; L 4+/5  Abduction: R 4/5; L 4+/5  ER: R 4/5; L 4+/5  IR: R 4/5; L 4+/5 Flexion: R 4/5; L 4+/5  Extension: R 4/5; L 4+/5         Special tests:   Positive right scour's test. Negative at the left. Also negative for sujey. [place] test.     Gait: pt ambulates on level ground with antalgia and right trunk lean.  Balance: SLS: R 20 sec, L 30 sec      Assessment: Patient presents with right hip tightness and guarding during his manual and self stretches. The patient also presents with right le weakness during his table exercises.      Goals: (to be met in 12 visits)  .Pt will have improved hip AROM Flex to 100 deg and ABD to 45 deg to be able to don/doff shoes and perform car transfers without difficulty   Pt will improve hip ABD and ER strength to 5/5 to increase ease with standing and walking   Pt will be able to squat to  light objects around the house with <1/10 hip pain   Pt will improve functional hip strength to report ability to ascend/descend 1  flight of stairs reciprocally without use of handrail   Pt will demonstrate improved SLS to >30 seconds ARELY to promote safety and decrease risk of falls on uneven surfaces such as grass and gravel   Pt will be independent and compliant with comprehensive HEP to maintain progress achieved in PT      Plan: Plan to work on stretching,   Date: 6/7/2024  TX#: 2/12 Date:  6/10/24               TX#: 3/12 Date:  6/14/24               TX#: 4/12 Date:                 TX#: 5/ Date:   Tx#: 6/   Ther. Ex.: 30'  Nu-Step 8' L5  Bilateral HS, glut., pfs, hf and skc st. 4x30\"xea.  SLR 20xea,  S/L Abd. 20xea,  Prone Hip Ext. 20xea.  Groin St. 3x30\" Ther. Ex.: 30'  Nu-Step 8' L5  Bilateral HS, glut., pfs, hf and skc st. 4x30\"xea.  Groin St. 3x30\"  Tennis Ball Self Mob. 3' Ther. Ex.: 30'  Nu-Step 8' L5  Bilateral HS, glut., pfs, hf and skc st. 4x30\"xea.  Groin St. 3x30\"  Tennis Ball Self Mob. 3'     Neuro Re-ed.: 15'  Bolster Squeeze 20x3\"  Bridges 2x20  Side steps 5 Laps  Hip Swings Forward and Lateral 20xea.  Neuro Re-ed.: 15'  Bolster Squeeze 20x3\"  Bridges 2x20  Side steps 5 Laps  Hip Swings Forward and Lateral 20xea.     HEP: Did not add any new exercises to his HEP.    Charges: 2TE and 1 Neuro      Total Timed Treatment: 45 min  Total Treatment Time: 45 min

## 2024-06-17 ENCOUNTER — OFFICE VISIT (OUTPATIENT)
Dept: PHYSICAL THERAPY | Age: 44
End: 2024-06-17
Attending: ORTHOPAEDIC SURGERY
Payer: COMMERCIAL

## 2024-06-17 DIAGNOSIS — M25.551 RIGHT HIP PAIN: Primary | ICD-10-CM

## 2024-06-17 PROCEDURE — 97110 THERAPEUTIC EXERCISES: CPT

## 2024-06-17 PROCEDURE — 97112 NEUROMUSCULAR REEDUCATION: CPT

## 2024-06-17 NOTE — PROGRESS NOTES
Diagnosis:   Right hip pain      Referring Provider: Joseph Arenas  Date of Evaluation:    6/7/24    Precautions:   Stomach Sleeve Next MD visit:   none scheduled  Date of Surgery: n/a   Insurance Primary/Secondary: BCBS OUT OF STATE / N/A     # Auth Visits: 12            Subjective: Patient reports that he has right hip pain this afternoon. He has been doing his hep and felt ok after his last visit.    Pain: 2.5/10      Objective:   Observation: Right trunk lean, hip er and pronation  Palpation: None.  Sensation: Intact sujey.     AROM: (* denotes performed with pain)  Hip   Flexion: R 93; L 100  Abduction: R 25; L 46         Accessory motion: Right hip hypomobility in all directions; +2 grades.     Flexibility:  Hip Flexor: R Tighter, L Tight  Hamstrings: R Tighter; L Tight  Piriformis: R Tighter; L Tight  Quads: R Tighter; L Tight  Gastroc-soleus: R Tighter; L Tight     Strength/MMT: (* denotes performed with pain)  Hip Knee   Flexion: R 4/5; L 4+/5  Extension: R 4/5; L 4+/5  Abduction: R 4/5; L 4+/5  ER: R 4/5; L 4+/5  IR: R 4/5; L 4+/5 Flexion: R 4/5; L 4+/5  Extension: R 4/5; L 4+/5         Special tests:   Positive right scour's test. Negative at the left. Also negative for sujey. [place] test.     Gait: pt ambulates on level ground with antalgia and right trunk lean.  Balance: SLS: R 20 sec, L 30 sec      Assessment: Patient presents with continued right hip tightness and guarding during his manual and self stretches. The patient also presents with right le weakness during his table and wb exercises.      Goals: (to be met in 12 visits)  .Pt will have improved hip AROM Flex to 100 deg and ABD to 45 deg to be able to don/doff shoes and perform car transfers without difficulty   Pt will improve hip ABD and ER strength to 5/5 to increase ease with standing and walking   Pt will be able to squat to  light objects around the house with <1/10 hip pain   Pt will improve functional hip strength to report  ability to ascend/descend 1 flight of stairs reciprocally without use of handrail   Pt will demonstrate improved SLS to >30 seconds ARELY to promote safety and decrease risk of falls on uneven surfaces such as grass and gravel   Pt will be independent and compliant with comprehensive HEP to maintain progress achieved in PT      Plan: Plan to work on stretching,   Date: 6/7/2024  TX#: 2/12 Date:  6/10/24               TX#: 3/12 Date:  6/14/24               TX#: 4/12 Date:   6/17/24              TX#: 5/12 Date:   Tx#: 6/   Ther. Ex.: 30'  Nu-Step 8' L5  Bilateral HS, glut., pfs, hf and skc st. 4x30\"xea.  SLR 20xea,  S/L Abd. 20xea,  Prone Hip Ext. 20xea.  Groin St. 3x30\" Ther. Ex.: 30'  Nu-Step 8' L5  Bilateral HS, glut., pfs, hf and skc st. 4x30\"xea.  Groin St. 3x30\"  Tennis Ball Self Mob. 3' Ther. Ex.: 30'  Nu-Step 8' L5  Bilateral HS, glut., pfs, hf and skc st. 4x30\"xea.  Groin St. 3x30\"  Tennis Ball Self Mob. 3' Ther. Ex.: 25'  Nu-Step 8' L5  Bilateral HS, glut., pfs, hf and skc st. 4x30\"xea.  Groin St. 3x30\"  Tennis Ball Self Mob. 3'    Neuro Re-ed.: 15'  Bolster Squeeze 20x3\"  Bridges 2x20  Side steps 5 Laps  Hip Swings Forward and Lateral 20xea.  Neuro Re-ed.: 15'  Bolster Squeeze 20x3\"  Bridges 2x20  Side steps 5 Laps  Hip Swings Forward and Lateral 20xea. Neuro Re-ed.: 20'  Bolster Squeeze 20x3\"  Bridges 2x20  Side steps 5 Laps  Hip Swings Forward and Lateral 20xea.  1/4 Squats 2x20    HEP: Did not add any new exercises to his HEP.    Charges: 2TE and 1 Neuro      Total Timed Treatment: 45 min  Total Treatment Time: 45 min

## 2024-06-21 ENCOUNTER — TELEPHONE (OUTPATIENT)
Dept: PHYSICAL THERAPY | Facility: HOSPITAL | Age: 44
End: 2024-06-21

## 2024-06-21 ENCOUNTER — OFFICE VISIT (OUTPATIENT)
Dept: PHYSICAL THERAPY | Age: 44
End: 2024-06-21

## 2024-06-21 DIAGNOSIS — M25.551 RIGHT HIP PAIN: Primary | ICD-10-CM

## 2024-06-21 PROCEDURE — 97112 NEUROMUSCULAR REEDUCATION: CPT

## 2024-06-21 PROCEDURE — 97110 THERAPEUTIC EXERCISES: CPT

## 2024-06-21 NOTE — PROGRESS NOTES
Diagnosis:   Right hip pain      Referring Provider: No ref. provider found  Date of Evaluation:    6/7/24    Precautions:   Stomach Sleeve Next MD visit:   none scheduled  Date of Surgery: n/a   Insurance Primary/Secondary: BCBS OUT OF STATE / N/A     # Auth Visits: 12            Subjective: Patient reports that he has right hip tightness this afternoon. He has been doing his hep and felt ok after his last visit.    Pain: 3/10      Objective:   Observation: Right trunk lean, hip er and pronation  Palpation: None.  Sensation: Intact sujey.     AROM: (* denotes performed with pain)  Hip   Flexion: R 93; L 100  Abduction: R 25; L 46         Accessory motion: Right hip hypomobility in all directions; +2 grades.     Flexibility:  Hip Flexor: R Tighter, L Tight  Hamstrings: R Tighter; L Tight  Piriformis: R Tighter; L Tight  Quads: R Tighter; L Tight  Gastroc-soleus: R Tighter; L Tight     Strength/MMT: (* denotes performed with pain)  Hip Knee   Flexion: R 4/5; L 4+/5  Extension: R 4/5; L 4+/5  Abduction: R 4/5; L 4+/5  ER: R 4/5; L 4+/5  IR: R 4/5; L 4+/5 Flexion: R 4/5; L 4+/5  Extension: R 4/5; L 4+/5         Special tests:   Positive right scour's test. Negative at the left. Also negative for sujey. [place] test.     Gait: pt ambulates on level ground with antalgia and right trunk lean.  Balance: SLS: R 20 sec, L 30 sec      Assessment: Patient presents with continued right hip tightness and guarding during his manual and self stretches. The patient also presents with right le weakness during his table and wb exercises. He did need some verbal cues to perform his act. Correctly.      Goals: (to be met in 12 visits)  .Pt will have improved hip AROM Flex to 100 deg and ABD to 45 deg to be able to don/doff shoes and perform car transfers without difficulty   Pt will improve hip ABD and ER strength to 5/5 to increase ease with standing and walking   Pt will be able to squat to  light objects around the house with  <1/10 hip pain   Pt will improve functional hip strength to report ability to ascend/descend 1 flight of stairs reciprocally without use of handrail   Pt will demonstrate improved SLS to >30 seconds ARELY to promote safety and decrease risk of falls on uneven surfaces such as grass and gravel   Pt will be independent and compliant with comprehensive HEP to maintain progress achieved in PT      Plan: Plan to work on stretching,   Date: 6/7/2024  TX#: 2/12 Date:  6/10/24               TX#: 3/12 Date:  6/14/24               TX#: 4/12 Date:   6/17/24              TX#: 5/12 Date: 6/21/24  Tx#: 6/12   Ther. Ex.: 30'  Nu-Step 8' L5  Bilateral HS, glut., pfs, hf and skc st. 4x30\"xea.  SLR 20xea,  S/L Abd. 20xea,  Prone Hip Ext. 20xea.  Groin St. 3x30\" Ther. Ex.: 30'  Nu-Step 8' L5  Bilateral HS, glut., pfs, hf and skc st. 4x30\"xea.  Groin St. 3x30\"  Tennis Ball Self Mob. 3' Ther. Ex.: 30'  Nu-Step 8' L5  Bilateral HS, glut., pfs, hf and skc st. 4x30\"xea.  Groin St. 3x30\"  Tennis Ball Self Mob. 3' Ther. Ex.: 25'  Nu-Step 8' L5  Bilateral HS, glut., pfs, hf and skc st. 4x30\"xea.  Groin St. 3x30\"  Tennis Ball Self Mob. 3' Ther. Ex.: 25'  Nu-Step 8' L5  Bilateral HS, glut., pfs, hf and skc st. 4x30\"xea.  Groin St. 3x30\"  Tennis Ball Self Mob. 3'  DKC 3x30\"   Neuro Re-ed.: 15'  Bolster Squeeze 20x3\"  Bridges 2x20  Side steps 5 Laps  Hip Swings Forward and Lateral 20xea.  Neuro Re-ed.: 15'  Bolster Squeeze 20x3\"  Bridges 2x20  Side steps 5 Laps  Hip Swings Forward and Lateral 20xea. Neuro Re-ed.: 20'  Bolster Squeeze 20x3\"  Bridges 2x20  Side steps 5 Laps  Hip Swings Forward and Lateral 20xea.  1/4 Squats 2x20 Neuro Re-ed.: 20'  Bolster Squeeze 20x3\"  Bridges 2x20  Side steps 5 Laps  Hip Swings Forward and Lateral 20xea.  1/4 Squats 2x20   HEP:  exercises to his HEP.    Charges: 2TE and 1 Neuro      Total Timed Treatment: 45 min  Total Treatment Time: 45 min

## 2024-06-27 ENCOUNTER — OFFICE VISIT (OUTPATIENT)
Dept: PHYSICAL THERAPY | Age: 44
End: 2024-06-27
Attending: ORTHOPAEDIC SURGERY

## 2024-06-27 DIAGNOSIS — M25.551 RIGHT HIP PAIN: Primary | ICD-10-CM

## 2024-06-27 PROCEDURE — 97112 NEUROMUSCULAR REEDUCATION: CPT

## 2024-06-27 PROCEDURE — 97110 THERAPEUTIC EXERCISES: CPT

## 2024-06-27 NOTE — PROGRESS NOTES
Diagnosis:   Right hip pain      Referring Provider: Joseph Arenas  Date of Evaluation:    6/7/24    Precautions:   Stomach Sleeve Next MD visit:   none scheduled  Date of Surgery: n/a   Insurance Primary/Secondary: BCBS OUT OF STATE / N/A     # Auth Visits: 12            Subjective: Patient reports that he has right hip tightness this afternoon. He has been doing his hep and felt ok after his last visit.    Pain: 4-5/10      Objective:   Observation: Right trunk lean, hip er and pronation  Palpation: None.  Sensation: Intact sujey.     AROM: (* denotes performed with pain)  Hip   Flexion: R 93; L 100  Abduction: R 25; L 46         Accessory motion: Right hip hypomobility in all directions; +2 grades.     Flexibility:  Hip Flexor: R Tighter, L Tight  Hamstrings: R Tighter; L Tight  Piriformis: R Tighter; L Tight  Quads: R Tighter; L Tight  Gastroc-soleus: R Tighter; L Tight     Strength/MMT: (* denotes performed with pain)  Hip Knee   Flexion: R 4/5; L 4+/5  Extension: R 4/5; L 4+/5  Abduction: R 4/5; L 4+/5  ER: R 4/5; L 4+/5  IR: R 4/5; L 4+/5 Flexion: R 4/5; L 4+/5  Extension: R 4/5; L 4+/5         Special tests:   Positive right scour's test. Negative at the left. Also negative for sujey. [place] test.     Gait: pt ambulates on level ground with antalgia and right trunk lean.  Balance: SLS: R 20 sec, L 30 sec      Assessment: Patient presents with continued right hip tightness and guarding during his self stretches. The patient also presents with right le weakness during his table and wb exercises. He did need some verbal cues to perform his act. Correctly.      Goals: (to be met in 12 visits)  .Pt will have improved hip AROM Flex to 100 deg and ABD to 45 deg to be able to don/doff shoes and perform car transfers without difficulty   Pt will improve hip ABD and ER strength to 5/5 to increase ease with standing and walking   Pt will be able to squat to  light objects around the house with <1/10 hip pain    Pt will improve functional hip strength to report ability to ascend/descend 1 flight of stairs reciprocally without use of handrail   Pt will demonstrate improved SLS to >30 seconds ARELY to promote safety and decrease risk of falls on uneven surfaces such as grass and gravel   Pt will be independent and compliant with comprehensive HEP to maintain progress achieved in PT      Plan: Plan to work on stretching,   Date: 6/7/2024  TX#: 2/12 Date:  6/10/24               TX#: 3/12 Date:  6/14/24               TX#: 4/12 Date:   6/17/24              TX#: 5/12 Date: 6/21/24  Tx#: 6/12 Date: 6/27/24  Tx#: 7/12   Ther. Ex.: 30'  Nu-Step 8' L5  Bilateral HS, glut., pfs, hf and skc st. 4x30\"xea.  SLR 20xea,  S/L Abd. 20xea,  Prone Hip Ext. 20xea.  Groin St. 3x30\" Ther. Ex.: 30'  Nu-Step 8' L5  Bilateral HS, glut., pfs, hf and skc st. 4x30\"xea.  Groin St. 3x30\"  Tennis Ball Self Mob. 3' Ther. Ex.: 30'  Nu-Step 8' L5  Bilateral HS, glut., pfs, hf and skc st. 4x30\"xea.  Groin St. 3x30\"  Tennis Ball Self Mob. 3' Ther. Ex.: 25'  Nu-Step 8' L5  Bilateral HS, glut., pfs, hf and skc st. 4x30\"xea.  Groin St. 3x30\"  Tennis Ball Self Mob. 3' Ther. Ex.: 25'  Nu-Step 8' L5  Bilateral HS, glut., pfs, hf and skc st. 4x30\"xea.  Groin St. 3x30\"  Tennis Ball Self Mob. 3'  DKC 3x30\" Ther. Ex.: 25'  Nu-Step 8' L5  Bilateral HS, glut., pfs, hf and skc st. 4x30\"xea.  Groin St. 3x30\"  Tennis Ball Self Mob. 3'  DKC 3x30\"   Neuro Re-ed.: 15'  Bolster Squeeze 20x3\"  Bridges 2x20  Side steps 5 Laps  Hip Swings Forward and Lateral 20xea.  Neuro Re-ed.: 15'  Bolster Squeeze 20x3\"  Bridges 2x20  Side steps 5 Laps  Hip Swings Forward and Lateral 20xea. Neuro Re-ed.: 20'  Bolster Squeeze 20x3\"  Bridges 2x20  Side steps 5 Laps  Hip Swings Forward and Lateral 20xea.  1/4 Squats 2x20 Neuro Re-ed.: 20'  Bolster Squeeze 20x3\"  Bridges 2x20  Side steps 5 Laps  Hip Swings Forward and Lateral 20xea.  1/4 Squats 2x20 Neuro Re-ed.: 20'  Bolster Squeeze 20x3\"  Bridges  2x20  Side steps 5 Laps  Hip Swings Forward and Lateral 20xea.  1/4 Squats 2x20   HEP:  Did not add new exercises to his HEP.    Charges: 2TE and 1 Neuro      Total Timed Treatment: 45 min  Total Treatment Time: 45 min

## 2024-07-01 ENCOUNTER — OFFICE VISIT (OUTPATIENT)
Dept: PHYSICAL THERAPY | Age: 44
End: 2024-07-01
Payer: COMMERCIAL

## 2024-07-01 DIAGNOSIS — M25.551 RIGHT HIP PAIN: Primary | ICD-10-CM

## 2024-07-01 PROCEDURE — 97110 THERAPEUTIC EXERCISES: CPT

## 2024-07-01 PROCEDURE — 97112 NEUROMUSCULAR REEDUCATION: CPT

## 2024-07-01 NOTE — PROGRESS NOTES
Diagnosis:   Right hip pain      Referring Provider: No ref. provider found  Date of Evaluation:    6/7/24    Precautions:   Stomach Sleeve Next MD visit:   none scheduled  Date of Surgery: n/a   Insurance Primary/Secondary: BCBS OUT OF STATE / N/A     # Auth Visits: 12            Subjective: Patient reports that he has continued right hip tightness this am. He has been doing his hep and felt ok after his last visit.    Pain: 2-3/10      Objective:   Observation: Right trunk lean, hip er and pronation  Palpation: None.  Sensation: Intact sujey.     AROM: (* denotes performed with pain)  Hip   Flexion: R 93; L 100  Abduction: R 25; L 46         Accessory motion: Right hip hypomobility in all directions; +2 grades.     Flexibility:  Hip Flexor: R Tighter, L Tight  Hamstrings: R Tighter; L Tight  Piriformis: R Tighter; L Tight  Quads: R Tighter; L Tight  Gastroc-soleus: R Tighter; L Tight     Strength/MMT: (* denotes performed with pain)  Hip Knee   Flexion: R 4/5; L 4+/5  Extension: R 4/5; L 4+/5  Abduction: R 4/5; L 4+/5  ER: R 4/5; L 4+/5  IR: R 4/5; L 4+/5 Flexion: R 4/5; L 4+/5  Extension: R 4/5; L 4+/5         Special tests:   Positive right scour's test. Negative at the left. Also negative for sujey. [place] test.     Gait: pt ambulates on level ground with antalgia and right trunk lean.  Balance: SLS: R 20 sec, L 30 sec      Assessment: Patient presents with continued right hip tightness and guarding during his self stretches. The patient still presents with right le weakness during his table and wb exercises.      Goals: (to be met in 12 visits)  .Pt will have improved hip AROM Flex to 100 deg and ABD to 45 deg to be able to don/doff shoes and perform car transfers without difficulty   Pt will improve hip ABD and ER strength to 5/5 to increase ease with standing and walking   Pt will be able to squat to  light objects around the house with <1/10 hip pain   Pt will improve functional hip strength to report  ability to ascend/descend 1 flight of stairs reciprocally without use of handrail   Pt will demonstrate improved SLS to >30 seconds ARELY to promote safety and decrease risk of falls on uneven surfaces such as grass and gravel   Pt will be independent and compliant with comprehensive HEP to maintain progress achieved in PT      Plan: Plan to work on stretching,   Date: 6/7/2024  TX#: 2/12 Date:  6/10/24               TX#: 3/12 Date:  6/14/24               TX#: 4/12 Date:   6/17/24              TX#: 5/12 Date: 6/21/24  Tx#: 6/12 Date: 6/27/24  Tx#: 7/12 Date: 7/1/24  Tx#: 8/12   Ther. Ex.: 30'  Nu-Step 8' L5  Bilateral HS, glut., pfs, hf and skc st. 4x30\"xea.  SLR 20xea,  S/L Abd. 20xea,  Prone Hip Ext. 20xea.  Groin St. 3x30\" Ther. Ex.: 30'  Nu-Step 8' L5  Bilateral HS, glut., pfs, hf and skc st. 4x30\"xea.  Groin St. 3x30\"  Tennis Ball Self Mob. 3' Ther. Ex.: 30'  Nu-Step 8' L5  Bilateral HS, glut., pfs, hf and skc st. 4x30\"xea.  Groin St. 3x30\"  Tennis Ball Self Mob. 3' Ther. Ex.: 25'  Nu-Step 8' L5  Bilateral HS, glut., pfs, hf and skc st. 4x30\"xea.  Groin St. 3x30\"  Tennis Ball Self Mob. 3' Ther. Ex.: 25'  Nu-Step 8' L5  Bilateral HS, glut., pfs, hf and skc st. 4x30\"xea.  Groin St. 3x30\"  Tennis Ball Self Mob. 3'  DKC 3x30\" Ther. Ex.: 25'  Nu-Step 8' L5  Bilateral HS, glut., pfs, hf and skc st. 4x30\"xea.  Groin St. 3x30\"  Tennis Ball Self Mob. 3'  DKC 3x30\" Ther. Ex.: 25'  Bike 8' L5  Bilateral HS, glut., pfs, hf and skc st. 4x30\"xea.  Groin St. 3x30\"  Tennis Ball Self Mob. 3'  DKC 3x30\"   Neuro Re-ed.: 15'  Bolster Squeeze 20x3\"  Bridges 2x20  Side steps 5 Laps  Hip Swings Forward and Lateral 20xea.  Neuro Re-ed.: 15'  Bolster Squeeze 20x3\"  Bridges 2x20  Side steps 5 Laps  Hip Swings Forward and Lateral 20xea. Neuro Re-ed.: 20'  Bolster Squeeze 20x3\"  Bridges 2x20  Side steps 5 Laps  Hip Swings Forward and Lateral 20xea.  1/4 Squats 2x20 Neuro Re-ed.: 20'  Bolster Squeeze 20x3\"  Bridges 2x20  Side steps 5  Laps  Hip Swings Forward and Lateral 20xea.  1/4 Squats 2x20 Neuro Re-ed.: 20'  Bolster Squeeze 20x3\"  Bridges 2x20  Side steps 5 Laps  Hip Swings Forward and Lateral 20xea.  1/4 Squats 2x20 Neuro Re-ed.: 20'  Bolster Squeeze 20x3\"  Bridges 2x20  Side steps 5 Laps  Hip Swings Forward and Lateral 20xea.  1/4 Squats 2x20  Walking Marching 5 Laps   HEP:  Added walking marching to his HEP.    Charges: 2TE and 1 Neuro      Total Timed Treatment: 45 min  Total Treatment Time: 45 min

## 2024-07-09 ENCOUNTER — OFFICE VISIT (OUTPATIENT)
Dept: PHYSICAL THERAPY | Age: 44
End: 2024-07-09
Payer: COMMERCIAL

## 2024-07-09 DIAGNOSIS — M25.551 RIGHT HIP PAIN: Primary | ICD-10-CM

## 2024-07-09 PROCEDURE — 97112 NEUROMUSCULAR REEDUCATION: CPT

## 2024-07-09 PROCEDURE — 97110 THERAPEUTIC EXERCISES: CPT

## 2024-07-09 NOTE — PROGRESS NOTES
Diagnosis:   Right hip pain      Referring Provider: No ref. provider found  Date of Evaluation:    6/7/24    Precautions:   Stomach Sleeve Next MD visit:   none scheduled  Date of Surgery: n/a   Insurance Primary/Secondary: BCBS OUT OF STATE / N/A     # Auth Visits: 12            Subjective: Patient reports that he has continued right hip tightness this am. He has been doing his hep and felt ok after his last visit.    Pain: 4/10      Objective:   Observation: Right trunk lean, hip er and pronation  Palpation: None.  Sensation: Intact sujey.     AROM: (* denotes performed with pain)  Hip   Flexion: R 93; L 100  Abduction: R 25; L 46         Accessory motion: Right hip hypomobility in all directions; +2 grades.     Flexibility:  Hip Flexor: R Tighter, L Tight  Hamstrings: R Tighter; L Tight  Piriformis: R Tighter; L Tight  Quads: R Tighter; L Tight  Gastroc-soleus: R Tighter; L Tight     Strength/MMT: (* denotes performed with pain)  Hip Knee   Flexion: R 4/5; L 4+/5  Extension: R 4/5; L 4+/5  Abduction: R 4/5; L 4+/5  ER: R 4/5; L 4+/5  IR: R 4/5; L 4+/5 Flexion: R 4/5; L 4+/5  Extension: R 4/5; L 4+/5         Special tests:   Positive right scour's test. Negative at the left. Also negative for sujey. [place] test.     Gait: pt ambulates on level ground with antalgia and right trunk lean.  Balance: SLS: R 20 sec, L 30 sec      Assessment: Patient presents with continued right hip tightness and guarding during his self stretches. The patient still presents with right le weakness and decreased rom during his table and wb exercises.      Goals: (to be met in 12 visits)  .Pt will have improved hip AROM Flex to 100 deg and ABD to 45 deg to be able to don/doff shoes and perform car transfers without difficulty   Pt will improve hip ABD and ER strength to 5/5 to increase ease with standing and walking   Pt will be able to squat to  light objects around the house with <1/10 hip pain   Pt will improve functional hip  strength to report ability to ascend/descend 1 flight of stairs reciprocally without use of handrail   Pt will demonstrate improved SLS to >30 seconds ARELY to promote safety and decrease risk of falls on uneven surfaces such as grass and gravel   Pt will be independent and compliant with comprehensive HEP to maintain progress achieved in PT      Plan: Plan to work on stretching,   Date: 6/7/2024  TX#: 2/12 Date:  6/10/24               TX#: 3/12 Date:  6/14/24               TX#: 4/12 Date:   6/17/24              TX#: 5/12 Date: 6/21/24  Tx#: 6/12 Date: 6/27/24  Tx#: 7/12 Date: 7/1/24  Tx#: 8/12 Date: 7/9/24  Tx#: 9/12   Ther. Ex.: 30'  Nu-Step 8' L5  Bilateral HS, glut., pfs, hf and skc st. 4x30\"xea.  SLR 20xea,  S/L Abd. 20xea,  Prone Hip Ext. 20xea.  Groin St. 3x30\" Ther. Ex.: 30'  Nu-Step 8' L5  Bilateral HS, glut., pfs, hf and skc st. 4x30\"xea.  Groin St. 3x30\"  Tennis Ball Self Mob. 3' Ther. Ex.: 30'  Nu-Step 8' L5  Bilateral HS, glut., pfs, hf and skc st. 4x30\"xea.  Groin St. 3x30\"  Tennis Ball Self Mob. 3' Ther. Ex.: 25'  Nu-Step 8' L5  Bilateral HS, glut., pfs, hf and skc st. 4x30\"xea.  Groin St. 3x30\"  Tennis Ball Self Mob. 3' Ther. Ex.: 25'  Nu-Step 8' L5  Bilateral HS, glut., pfs, hf and skc st. 4x30\"xea.  Groin St. 3x30\"  Tennis Ball Self Mob. 3'  DKC 3x30\" Ther. Ex.: 25'  Nu-Step 8' L5  Bilateral HS, glut., pfs, hf and skc st. 4x30\"xea.  Groin St. 3x30\"  Tennis Ball Self Mob. 3'  DKC 3x30\" Ther. Ex.: 25'  Bike 8' L5  Bilateral HS, glut., pfs, hf and skc st. 4x30\"xea.  Groin St. 3x30\"  Tennis Ball Self Mob. 3'  DKC 3x30\" Ther. Ex.: 25'  Bike 8' L5  Bilateral HS, glut., pfs, hf and skc st. 4x30\"xea.  Groin St. 3x30\"  Tennis Ball Self Mob. 3'  DKC 3x30\"   Neuro Re-ed.: 15'  Bolster Squeeze 20x3\"  Bridges 2x20  Side steps 5 Laps  Hip Swings Forward and Lateral 20xea.  Neuro Re-ed.: 15'  Bolster Squeeze 20x3\"  Bridges 2x20  Side steps 5 Laps  Hip Swings Forward and Lateral 20xea. Neuro Re-ed.: 20'  Bolster  Squeeze 20x3\"  Bridges 2x20  Side steps 5 Laps  Hip Swings Forward and Lateral 20xea.  1/4 Squats 2x20 Neuro Re-ed.: 20'  Bolster Squeeze 20x3\"  Bridges 2x20  Side steps 5 Laps  Hip Swings Forward and Lateral 20xea.  1/4 Squats 2x20 Neuro Re-ed.: 20'  Bolster Squeeze 20x3\"  Bridges 2x20  Side steps 5 Laps  Hip Swings Forward and Lateral 20xea.  1/4 Squats 2x20 Neuro Re-ed.: 20'  Bolster Squeeze 20x3\"  Bridges 2x20  Side steps 5 Laps  Hip Swings Forward and Lateral 20xea.  1/4 Squats 2x20  Walking Marching 5 Laps Neuro Re-ed.: 20'  Bolster Squeeze 20x3\"  Bridges 2x20  Side steps 5 Laps  Hip Swings Forward and Lateral 20xea.  1/4 Squats 2x20  Walking Marching 5 Laps   HEP:  Did not add any new exercises to his HEP.    Charges: 2TE and 1 Neuro      Total Timed Treatment: 45 min  Total Treatment Time: 45 min

## 2024-07-10 ENCOUNTER — OFFICE VISIT (OUTPATIENT)
Dept: FAMILY MEDICINE CLINIC | Facility: CLINIC | Age: 44
End: 2024-07-10
Payer: COMMERCIAL

## 2024-07-10 VITALS
HEIGHT: 67 IN | BODY MASS INDEX: 30.45 KG/M2 | WEIGHT: 194 LBS | DIASTOLIC BLOOD PRESSURE: 84 MMHG | HEART RATE: 66 BPM | SYSTOLIC BLOOD PRESSURE: 136 MMHG

## 2024-07-10 DIAGNOSIS — Z98.84 H/O GASTRIC BYPASS: ICD-10-CM

## 2024-07-10 DIAGNOSIS — M25.551 RIGHT HIP PAIN: ICD-10-CM

## 2024-07-10 DIAGNOSIS — E66.9 OBESITY (BMI 30.0-34.9): ICD-10-CM

## 2024-07-10 DIAGNOSIS — R74.8 ELEVATED LIVER ENZYMES: ICD-10-CM

## 2024-07-10 DIAGNOSIS — I83.93 ASYMPTOMATIC VARICOSE VEINS OF BOTH LOWER EXTREMITIES: ICD-10-CM

## 2024-07-10 DIAGNOSIS — Z12.11 SCREENING FOR COLON CANCER: ICD-10-CM

## 2024-07-10 DIAGNOSIS — F98.8 ATTENTION DEFICIT DISORDER (ADD) WITHOUT HYPERACTIVITY: ICD-10-CM

## 2024-07-10 DIAGNOSIS — Z00.00 WELL ADULT EXAM: Primary | ICD-10-CM

## 2024-07-10 RX ORDER — DEXTROAMPHETAMINE SACCHARATE, AMPHETAMINE ASPARTATE, DEXTROAMPHETAMINE SULFATE AND AMPHETAMINE SULFATE 5; 5; 5; 5 MG/1; MG/1; MG/1; MG/1
20 TABLET ORAL
COMMUNITY
Start: 2024-07-09

## 2024-07-10 NOTE — PROGRESS NOTES
HPI    Pt here annual physical   Still having right hip pain-had cortisone injection, is going to PHYSICAL THERAPY once a week, goes to gym. Has mod-severe arthritis. Pt states he has discomfort but not pain. Is active. Will take ibuprofen as needed.     Has h/o gastric sx lost 200 lbs. -Gastroplasty duodenal switch    Has a lot of varicosities in bilat legs-left . Right; has mass to frontal aspect of left shin-has had for years-had it check out years ago and was told it was nothing to worry about.     Diet-healthy, eats 3 meals per day  Exercising-goes to gym and doing PHYSICAL THERAPY  Sleep-well rested.    Struggles with taking gastric vitamins.     Review of Systems   Constitutional:  Negative for activity change, appetite change and fatigue.   HENT:  Negative for congestion, rhinorrhea, sinus pressure and sore throat.    Respiratory:  Negative for cough and shortness of breath.    Cardiovascular:  Negative for chest pain, palpitations and leg swelling.   Gastrointestinal:  Negative for abdominal distention, abdominal pain, constipation, diarrhea (stools are looser due to gastric surgery), nausea and vomiting.   Genitourinary:  Negative for dysuria and testicular pain.   Musculoskeletal:  Positive for arthralgias. Negative for back pain, gait problem and myalgias.        Bilat leg varicosities    Neurological:  Negative for dizziness and light-headedness.   Psychiatric/Behavioral:  Negative for decreased concentration and dysphoric mood. The patient is not nervous/anxious.        Vitals:    07/10/24 1620 07/10/24 1657   BP: 144/87 136/84   Pulse: 66    Weight: 194 lb (88 kg)    Height: 5' 7\" (1.702 m)      Body mass index is 30.38 kg/m².  Wt Readings from Last 6 Encounters:   07/10/24 194 lb (88 kg)   03/20/24 195 lb (88.5 kg)   01/12/23 214 lb (97.1 kg)   08/09/21 235 lb (106.6 kg)   08/19/20 230 lb (104.3 kg)   08/15/18 240 lb 4.8 oz (109 kg)         Health Maintenance   Topic Date Due    Colorectal Cancer  Screening  Never done    COVID-19 Vaccine (1 - 2023-24 season) Never done    Annual Physical  01/12/2024    Influenza Vaccine (1) 10/01/2024    DTaP,Tdap,and Td Vaccines (2 - Td or Tdap) 08/19/2030    Annual Depression Screening  Completed    Pneumococcal Vaccine: Birth to 64yrs  Aged Out       Past Medical History:    Attention deficit disorder without mention of hyperactivity    Calculus of gallbladder without mention of cholecystitis or obstruction    History of bariatric surgery    Hyperglycemia    on fasting bloodwork    Hypertriglyceridemia    Lipid screening    Morbid obesity (HCC)    Rosacea    Unspecified essential hypertension       .  Past Surgical History:   Procedure Laterality Date    Cholecystectomy      Gastroplasty duodenal switch      Hand/finger surgery unlisted  01/01/1994    Laparoscopic cholecystectomy  02/14/2008    Middle ear surgery proc unlisted  as a child    tubes in ears-    Tonsillectomy  01/01/1988    w/denys       Family History   Problem Relation Age of Onset    Other (HTN[other]) Mother     Hypertension Mother     Heart Attack Maternal Grandmother        Social History     Socioeconomic History    Marital status:      Spouse name: Not on file    Number of children: Not on file    Years of education: Not on file    Highest education level: Not on file   Occupational History    Not on file   Tobacco Use    Smoking status: Never    Smokeless tobacco: Never   Vaping Use    Vaping status: Never Used   Substance and Sexual Activity    Alcohol use: No     Alcohol/week: 0.0 standard drinks of alcohol    Drug use: No    Sexual activity: Not on file   Other Topics Concern     Service Not Asked    Blood Transfusions Not Asked    Caffeine Concern Yes     Comment: Coffee; Eneregy drinks    Occupational Exposure Not Asked    Hobby Hazards Not Asked    Sleep Concern Not Asked    Stress Concern Not Asked    Weight Concern Not Asked    Special Diet Not Asked    Back Care Not Asked     Exercise Yes     Comment: walks    Bike Helmet Not Asked    Seat Belt Not Asked    Self-Exams Not Asked   Social History Narrative    Not on file     Social Determinants of Health     Financial Resource Strain: Not on file   Food Insecurity: Not on file   Transportation Needs: Not on file   Physical Activity: Not on file   Stress: Not on file   Social Connections: Not on file   Housing Stability: Not on file       Current Outpatient Medications   Medication Sig Dispense Refill    amphetamine-dextroamphetamine 20 MG Oral Tab 1 tablet (20 mg total).      ORACEA 40 MG Oral Capsule Delayed Release Take 1 capsule by mouth daily.      methylphenidate 20 MG Oral Tab  (Patient not taking: Reported on 7/10/2024)         Allergies:  No Known Allergies    Physical Exam  Vitals and nursing note reviewed.   Constitutional:       General: He is not in acute distress.     Appearance: He is well-developed.   HENT:      Head: Normocephalic and atraumatic.      Right Ear: Tympanic membrane, ear canal and external ear normal.      Left Ear: Tympanic membrane, ear canal and external ear normal.      Nose: No congestion or rhinorrhea.      Mouth/Throat:      Mouth: Mucous membranes are moist.      Pharynx: Oropharynx is clear. No oropharyngeal exudate or posterior oropharyngeal erythema.   Eyes:      General:         Right eye: No discharge.         Left eye: No discharge.      Conjunctiva/sclera: Conjunctivae normal.      Pupils: Pupils are equal, round, and reactive to light.   Neck:      Thyroid: No thyromegaly.   Cardiovascular:      Rate and Rhythm: Normal rate and regular rhythm.      Heart sounds: Normal heart sounds. No murmur heard.  Pulmonary:      Effort: Pulmonary effort is normal. No respiratory distress.      Breath sounds: Normal breath sounds. No wheezing or rales.   Chest:      Chest wall: No tenderness.   Abdominal:      General: Bowel sounds are normal. There is no distension.      Palpations: Abdomen is soft.       Tenderness: There is no abdominal tenderness.   Musculoskeletal:         General: Tenderness present. Normal range of motion.      Cervical back: Normal range of motion and neck supple.      Left hip: Bony tenderness present. Normal range of motion.        Legs:       Comments: Significant tortuous veins noted to bilat legs-L>R. Trace ankle edema bilat   Lymphadenopathy:      Cervical: No cervical adenopathy.   Skin:     General: Skin is warm and dry.      Findings: No rash.   Neurological:      Mental Status: He is alert and oriented to person, place, and time.      Coordination: Coordination normal.      Gait: Gait normal.   Psychiatric:         Behavior: Behavior normal.         Thought Content: Thought content normal.         Judgment: Judgment normal.         Assessment and Plan:   Problem List Items Addressed This Visit       Asymptomatic varicose veins of both lower extremities     Ultrasound venous insufficiency  Refer vascular sx  Encourage pt to elevate legs when recumbent or sitting  Please call if symptoms worsen or are not resolving.           Relevant Orders     VENOUS INSUFFICIENCY (REFLUX) BILAT LOWER EXT (CPT=93970)    VASCULAR SURGERY - INTERNAL    Attention deficit disorder (ADD) without hyperactivity     cpm         Relevant Medications    amphetamine-dextroamphetamine 20 MG Oral Tab    Elevated liver enzymes     Check liver enzymes         H/O gastric bypass    Relevant Orders    [facility] Telephone Colon Screen    Iron And Tibc    Obesity (BMI 30.0-34.9)     Has lost 200 lbs w gastric bypass sx  Eats healthy  Encourage pt to be more consistent with bariatric vitamins  Bariatric labs checked  Please aim to eat a diet high in fresh fruits and vegetables, lean protein sources, complex carbohydrates and limited processed and fast foods.  Try to get at least 150 minutes of exercise per week-a combination of weight resistance and cardio is preferred.             Right hip pain     Continue PHYSICAL  THERAPY  Sees ortho  Continue present management           Screening for colon cancer     Discussed colon cancer screening options, pros and cons  Pt agrees to colonoscopy-referral placed         Relevant Orders    [facility] Telephone Colon Screen    Well adult exam - Primary     Screening labs ordered  Please aim to eat a diet high in fresh fruits and vegetables, lean protein sources, complex carbohydrates and limited processed and fast foods.  Try to get at least 150 minutes of exercise per week-a combination of weight resistance and cardio is preferred.    Referral for colonoscopy placed  Up to date vaccines ex for covid           Relevant Orders    CBC, Platelet; No Differential    Comp Metabolic Panel (14)    Hemoglobin A1C    Lipid Panel    TSH W Reflex To Free T4    Vitamin B12    Vitamin D, 25-Hydroxy    Magnesium    Vitamin B1 (Thiamine), Blood               Discussed plan of care with pt and pt is in agreement.All questions answered. Pt to call with questions or concerns.      Encouraged to sign up for My Chart if not already registered.     Note to patient and family:  The 21st Century Cures Act makes medical notes available to patients in the interest of transparency.  However, please be advised that this is a medical document.  It is intended as a peer to peer communication.  It is written in medical language and may contain abbreviations or verbiage that are technical and unfamiliar.  It may appear blunt or direct.  Medical documents are intended to carry relevant information, facts as evident, and the clinical opinion of the practitioner.

## 2024-07-11 NOTE — ASSESSMENT & PLAN NOTE
Ultrasound venous insufficiency  Refer vascular sx  Encourage pt to elevate legs when recumbent or sitting  Please call if symptoms worsen or are not resolving.

## 2024-07-11 NOTE — ASSESSMENT & PLAN NOTE
Screening labs ordered  Please aim to eat a diet high in fresh fruits and vegetables, lean protein sources, complex carbohydrates and limited processed and fast foods.  Try to get at least 150 minutes of exercise per week-a combination of weight resistance and cardio is preferred.    Referral for colonoscopy placed  Up to date vaccines ex for covid

## 2024-07-11 NOTE — ASSESSMENT & PLAN NOTE
Has lost 200 lbs w gastric bypass sx  Eats healthy  Encourage pt to be more consistent with bariatric vitamins  Bariatric labs checked  Please aim to eat a diet high in fresh fruits and vegetables, lean protein sources, complex carbohydrates and limited processed and fast foods.  Try to get at least 150 minutes of exercise per week-a combination of weight resistance and cardio is preferred.       150

## 2024-07-11 NOTE — ASSESSMENT & PLAN NOTE
Discussed colon cancer screening options, pros and cons  Pt agrees to colonoscopy-referral placed

## 2024-07-15 ENCOUNTER — TELEPHONE (OUTPATIENT)
Dept: PHYSICAL THERAPY | Facility: HOSPITAL | Age: 44
End: 2024-07-15

## 2024-07-19 ENCOUNTER — APPOINTMENT (OUTPATIENT)
Dept: PHYSICAL THERAPY | Age: 44
End: 2024-07-19
Payer: COMMERCIAL

## 2024-07-26 ENCOUNTER — LAB ENCOUNTER (OUTPATIENT)
Dept: LAB | Age: 44
End: 2024-07-26
Attending: NURSE PRACTITIONER
Payer: COMMERCIAL

## 2024-07-26 LAB
ALBUMIN SERPL-MCNC: 4.1 G/DL (ref 3.2–4.8)
ALBUMIN/GLOB SERPL: 1.8 {RATIO} (ref 1–2)
ALP LIVER SERPL-CCNC: 135 U/L
ALT SERPL-CCNC: 76 U/L
ANION GAP SERPL CALC-SCNC: 5 MMOL/L (ref 0–18)
AST SERPL-CCNC: 63 U/L (ref ?–34)
BILIRUB SERPL-MCNC: 1.1 MG/DL (ref 0.3–1.2)
BUN BLD-MCNC: 16 MG/DL (ref 9–23)
BUN/CREAT SERPL: 17.2 (ref 10–20)
CALCIUM BLD-MCNC: 9 MG/DL (ref 8.7–10.4)
CHLORIDE SERPL-SCNC: 110 MMOL/L (ref 98–112)
CHOLEST SERPL-MCNC: 116 MG/DL (ref ?–200)
CO2 SERPL-SCNC: 28 MMOL/L (ref 21–32)
CREAT BLD-MCNC: 0.93 MG/DL
DEPRECATED RDW RBC AUTO: 42.2 FL (ref 35.1–46.3)
EGFRCR SERPLBLD CKD-EPI 2021: 103 ML/MIN/1.73M2 (ref 60–?)
ERYTHROCYTE [DISTWIDTH] IN BLOOD BY AUTOMATED COUNT: 13.2 % (ref 11–15)
EST. AVERAGE GLUCOSE BLD GHB EST-MCNC: 85 MG/DL (ref 68–126)
FASTING PATIENT LIPID ANSWER: YES
FASTING STATUS PATIENT QL REPORTED: YES
GLOBULIN PLAS-MCNC: 2.3 G/DL (ref 2–3.5)
GLUCOSE BLD-MCNC: 89 MG/DL (ref 70–99)
HBA1C MFR BLD: 4.6 % (ref ?–5.7)
HCT VFR BLD AUTO: 42.6 %
HDLC SERPL-MCNC: 53 MG/DL (ref 40–59)
HGB BLD-MCNC: 14.2 G/DL
IRON SATN MFR SERPL: 31 %
IRON SERPL-MCNC: 125 UG/DL
LDLC SERPL CALC-MCNC: 46 MG/DL (ref ?–100)
MAGNESIUM SERPL-MCNC: 1.7 MG/DL (ref 1.6–2.6)
MCH RBC QN AUTO: 29.2 PG (ref 26–34)
MCHC RBC AUTO-ENTMCNC: 33.3 G/DL (ref 31–37)
MCV RBC AUTO: 87.7 FL
NONHDLC SERPL-MCNC: 63 MG/DL (ref ?–130)
OSMOLALITY SERPL CALC.SUM OF ELEC: 297 MOSM/KG (ref 275–295)
PLATELET # BLD AUTO: 161 10(3)UL (ref 150–450)
POTASSIUM SERPL-SCNC: 5 MMOL/L (ref 3.5–5.1)
PROT SERPL-MCNC: 6.4 G/DL (ref 5.7–8.2)
RBC # BLD AUTO: 4.86 X10(6)UL
SODIUM SERPL-SCNC: 143 MMOL/L (ref 136–145)
TIBC SERPL-MCNC: 408 UG/DL (ref 250–425)
TRANSFERRIN SERPL-MCNC: 274 MG/DL (ref 215–365)
TRIGL SERPL-MCNC: 85 MG/DL (ref 30–149)
TSI SER-ACNC: 3.83 MIU/ML (ref 0.55–4.78)
VIT B12 SERPL-MCNC: 1609 PG/ML (ref 211–911)
VIT D+METAB SERPL-MCNC: 29.6 NG/ML (ref 30–100)
VLDLC SERPL CALC-MCNC: 12 MG/DL (ref 0–30)
WBC # BLD AUTO: 5 X10(3) UL (ref 4–11)

## 2024-07-26 PROCEDURE — 36415 COLL VENOUS BLD VENIPUNCTURE: CPT | Performed by: NURSE PRACTITIONER

## 2024-07-26 PROCEDURE — 85027 COMPLETE CBC AUTOMATED: CPT | Performed by: NURSE PRACTITIONER

## 2024-07-26 PROCEDURE — 83540 ASSAY OF IRON: CPT | Performed by: NURSE PRACTITIONER

## 2024-07-26 PROCEDURE — 80053 COMPREHEN METABOLIC PANEL: CPT | Performed by: NURSE PRACTITIONER

## 2024-07-26 PROCEDURE — 83735 ASSAY OF MAGNESIUM: CPT | Performed by: NURSE PRACTITIONER

## 2024-07-26 PROCEDURE — 82607 VITAMIN B-12: CPT | Performed by: NURSE PRACTITIONER

## 2024-07-26 PROCEDURE — 84466 ASSAY OF TRANSFERRIN: CPT | Performed by: NURSE PRACTITIONER

## 2024-07-26 PROCEDURE — 84425 ASSAY OF VITAMIN B-1: CPT | Performed by: NURSE PRACTITIONER

## 2024-07-26 PROCEDURE — 80061 LIPID PANEL: CPT | Performed by: NURSE PRACTITIONER

## 2024-07-26 PROCEDURE — 84443 ASSAY THYROID STIM HORMONE: CPT | Performed by: NURSE PRACTITIONER

## 2024-07-26 PROCEDURE — 82306 VITAMIN D 25 HYDROXY: CPT | Performed by: NURSE PRACTITIONER

## 2024-07-26 PROCEDURE — 83036 HEMOGLOBIN GLYCOSYLATED A1C: CPT | Performed by: NURSE PRACTITIONER

## 2024-07-29 ENCOUNTER — OFFICE VISIT (OUTPATIENT)
Dept: PHYSICAL THERAPY | Age: 44
End: 2024-07-29
Payer: COMMERCIAL

## 2024-07-29 DIAGNOSIS — M25.551 RIGHT HIP PAIN: Primary | ICD-10-CM

## 2024-07-29 PROCEDURE — 97112 NEUROMUSCULAR REEDUCATION: CPT

## 2024-07-29 PROCEDURE — 97110 THERAPEUTIC EXERCISES: CPT

## 2024-07-29 NOTE — PROGRESS NOTES
Diagnosis:   Right hip pain      Referring Provider: No ref. provider found  Date of Evaluation:    6/7/24    Precautions:   Stomach Sleeve Next MD visit:   none scheduled  Date of Surgery: n/a   Insurance Primary/Secondary: BCBS OUT OF STATE / N/A     # Auth Visits: 12            Subjective: Patient reports that he has continued right hip tightness this am. He has been doing his hep and felt ok after his last visit.    Pain: 3-4/10      Objective:   Observation: Right trunk lean, hip er and pronation  Palpation: None.  Sensation: Intact sujey.     AROM: (* denotes performed with pain)  Hip   Flexion: R 93; L 100  Abduction: R 25; L 46         Accessory motion: Right hip hypomobility in all directions; +2 grades.     Flexibility:  Hip Flexor: R Tighter, L Tight  Hamstrings: R Tighter; L Tight  Piriformis: R Tighter; L Tight  Quads: R Tighter; L Tight  Gastroc-soleus: R Tighter; L Tight     Strength/MMT: (* denotes performed with pain)  Hip Knee   Flexion: R 4/5; L 4+/5  Extension: R 4/5; L 4+/5  Abduction: R 4/5; L 4+/5  ER: R 4/5; L 4+/5  IR: R 4/5; L 4+/5 Flexion: R 4/5; L 4+/5  Extension: R 4/5; L 4+/5         Special tests:   Positive right scour's test. Negative at the left. Also negative for sujey. [place] test.     Gait: pt ambulates on level ground with antalgia and right trunk lean.  Balance: SLS: R 20 sec, L 30 sec      Assessment: Patient presents with continued right hip tightness and guarding during his self stretches. The patient still presents with continued right le weakness and decreased rom during his table and wb exercises.      Goals: (to be met in 12 visits)  .Pt will have improved hip AROM Flex to 100 deg and ABD to 45 deg to be able to don/doff shoes and perform car transfers without difficulty   Pt will improve hip ABD and ER strength to 5/5 to increase ease with standing and walking   Pt will be able to squat to  light objects around the house with <1/10 hip pain   Pt will improve  functional hip strength to report ability to ascend/descend 1 flight of stairs reciprocally without use of handrail   Pt will demonstrate improved SLS to >30 seconds ARELY to promote safety and decrease risk of falls on uneven surfaces such as grass and gravel   Pt will be independent and compliant with comprehensive HEP to maintain progress achieved in PT      Plan: Plan to work on stretching,   Date: 6/7/2024  TX#: 2/12 Date:  6/10/24               TX#: 3/12 Date:  6/14/24               TX#: 4/12 Date:   6/17/24              TX#: 5/12 Date: 6/21/24  Tx#: 6/12 Date: 6/27/24  Tx#: 7/12 Date: 7/1/24  Tx#: 8/12 Date: 7/9/24  Tx#: 9/12   Ther. Ex.: 30'  Nu-Step 8' L5  Bilateral HS, glut., pfs, hf and skc st. 4x30\"xea.  SLR 20xea,  S/L Abd. 20xea,  Prone Hip Ext. 20xea.  Groin St. 3x30\" Ther. Ex.: 30'  Nu-Step 8' L5  Bilateral HS, glut., pfs, hf and skc st. 4x30\"xea.  Groin St. 3x30\"  Tennis Ball Self Mob. 3' Ther. Ex.: 30'  Nu-Step 8' L5  Bilateral HS, glut., pfs, hf and skc st. 4x30\"xea.  Groin St. 3x30\"  Tennis Ball Self Mob. 3' Ther. Ex.: 25'  Nu-Step 8' L5  Bilateral HS, glut., pfs, hf and skc st. 4x30\"xea.  Groin St. 3x30\"  Tennis Ball Self Mob. 3' Ther. Ex.: 25'  Nu-Step 8' L5  Bilateral HS, glut., pfs, hf and skc st. 4x30\"xea.  Groin St. 3x30\"  Tennis Ball Self Mob. 3'  DKC 3x30\" Ther. Ex.: 25'  Nu-Step 8' L5  Bilateral HS, glut., pfs, hf and skc st. 4x30\"xea.  Groin St. 3x30\"  Tennis Ball Self Mob. 3'  DKC 3x30\" Ther. Ex.: 25'  Bike 8' L5  Bilateral HS, glut., pfs, hf and skc st. 4x30\"xea.  Groin St. 3x30\"  Tennis Ball Self Mob. 3'  DKC 3x30\" Ther. Ex.: 25'  Bike 8' L5  Bilateral HS, glut., pfs, hf and skc st. 4x30\"xea.  Groin St. 3x30\"  Tennis Ball Self Mob. 3'  DKC 3x30\"   Neuro Re-ed.: 15'  Bolster Squeeze 20x3\"  Bridges 2x20  Side steps 5 Laps  Hip Swings Forward and Lateral 20xea.  Neuro Re-ed.: 15'  Bolster Squeeze 20x3\"  Bridges 2x20  Side steps 5 Laps  Hip Swings Forward and Lateral 20xea. Neuro Re-ed.:  20'  Bolster Squeeze 20x3\"  Bridges 2x20  Side steps 5 Laps  Hip Swings Forward and Lateral 20xea.  1/4 Squats 2x20 Neuro Re-ed.: 20'  Bolster Squeeze 20x3\"  Bridges 2x20  Side steps 5 Laps  Hip Swings Forward and Lateral 20xea.  1/4 Squats 2x20 Neuro Re-ed.: 20'  Bolster Squeeze 20x3\"  Bridges 2x20  Side steps 5 Laps  Hip Swings Forward and Lateral 20xea.  1/4 Squats 2x20 Neuro Re-ed.: 20'  Bolster Squeeze 20x3\"  Bridges 2x20  Side steps 5 Laps  Hip Swings Forward and Lateral 20xea.  1/4 Squats 2x20  Walking Marching 5 Laps Neuro Re-ed.: 20'  Bolster Squeeze 20x3\"  Bridges 2x20  Side steps 5 Laps  Hip Swings Forward and Lateral 20xea.  1/4 Squats 2x20  Walking Marching 5 Laps   HEP:  Did not add any new exercises to his HEP.    Charges: 2TE and 1 Neuro      Total Timed Treatment: 45 min  Total Treatment Time: 45 min

## 2024-07-30 LAB — VITAMIN B1 WHOLE BLD: 117.5 NMOL/L

## 2024-08-01 ENCOUNTER — NURSE ONLY (OUTPATIENT)
Facility: CLINIC | Age: 44
End: 2024-08-01

## 2024-08-01 DIAGNOSIS — Z12.11 COLON CANCER SCREENING: Primary | ICD-10-CM

## 2024-08-01 NOTE — PROGRESS NOTES
*Provider per rotation (if necessary)*     Called patient for scheduled telephone colon screening   Medications, pharmacy, and allergies verified with the patient.   **Please advise on colonoscopy and bowel prep orders**.   Patient reported not being able to tolerate Golytely bowel prep from previous gastric procedure (2012).  Please advise on bowel prep.

## 2024-08-01 NOTE — PROGRESS NOTES
*Provider per rotation (if necessary)*    Called patient for scheduled telephone colon screening/positive FIT result.   Medications, pharmacy, and allergies verified with the patient.   **Please advise on colonoscopy and bowel prep orders**.     Age 45-66 y/o (Y/N):   66-74 y/o ? Route to GI provider per rotation schedule   › GI MD preference: No preference   › Insurance:  BLUE CROSS OUT OF STATE   › Last PCP Visit:  Aishwarya Mendiola APRN 7/10/2024  IF NO PCP within [facility] system ? Not TCS/FIT Eligible   › Last CBC drawn: 7/26/2024  › Date of positive FIT test: n/a  › H/W/BMI:  67 in / 194 lbs / 30.38      Special comments/notes: Patient is on Adderall   Telephone Colon Screening Questionnaire Yes No   Are you currently experiencing any new/abnormal GI symptoms? [] [x]   If yes, explain:     Rectal bleeding? [] [x]   Black stool? [] [x]   Dysphagia or food \"feeling stuck\" when eating? [] [x]   Intractable vomiting? [] [x]   Unexplained weight loss? [] [x]   First colonoscopy? [x] []   Family history of colon cancer? [] [x]   Any issues with anesthesia? [] [x]   If yes, explain:      Have you had a stroke, heart attack or stent placement in the last 12 months?  [] [x]   If yes ? GI in-person consultation      Personal history of resp. Issues/oxygen use/TRISTON/COPD [] [x]   If yes, CPAP/BiPAP?     History of devices (pacemaker/defibrillator) [] [x]   History of cardiac/CVA issues/(MI/stroke) (see above) [] [x]     Medication usage:  Yes  No   Anticoagulants:  Anticoagulant (Except Aspirin) ? Route to RN staff to obtain ordering provider orders [] [x]   Diabetic Meds:   PO DM Meds ? hold day prior and day of procedure  Insulin ? Route to RN clinical staff to obtain provider orders  [] [x]   Weight loss meds (Phentermine/Vyvanse/Saxsenda/etc):  [] [x]   Iron/Herbal/Multivitamin Supplement (RX/OTC): [] [x]   Usage of marijuana, CBD &/or vape products: [] [x]

## 2024-08-02 ENCOUNTER — OFFICE VISIT (OUTPATIENT)
Dept: PHYSICAL THERAPY | Age: 44
End: 2024-08-02
Payer: COMMERCIAL

## 2024-08-02 DIAGNOSIS — M25.551 RIGHT HIP PAIN: Primary | ICD-10-CM

## 2024-08-02 PROCEDURE — 97110 THERAPEUTIC EXERCISES: CPT

## 2024-08-02 PROCEDURE — 97112 NEUROMUSCULAR REEDUCATION: CPT

## 2024-08-02 NOTE — PROGRESS NOTES
Diagnosis:   Right hip pain      Referring Provider: No ref. provider found  Date of Evaluation:    6/7/24    Precautions:   Stomach Sleeve Next MD visit:   none scheduled  Date of Surgery: n/a   Insurance Primary/Secondary: BCBS OUT OF STATE / N/A     # Auth Visits: 12            Subjective: Patient reports that he has continued right hip pain. He has been doing his hep and felt ok after his last visit.    Pain: 2-3/10      Objective:   Observation: Right trunk lean, hip er and pronation  Palpation: None.  Sensation: Intact sujey.     AROM: (* denotes performed with pain)  Hip   Flexion: R 93; L 100  Abduction: R 25; L 46         Accessory motion: Right hip hypomobility in all directions; +2 grades.     Flexibility:  Hip Flexor: R Tighter, L Tight  Hamstrings: R Tighter; L Tight  Piriformis: R Tighter; L Tight  Quads: R Tighter; L Tight  Gastroc-soleus: R Tighter; L Tight     Strength/MMT: (* denotes performed with pain)  Hip Knee   Flexion: R 4/5; L 4+/5  Extension: R 4/5; L 4+/5  Abduction: R 4/5; L 4+/5  ER: R 4/5; L 4+/5  IR: R 4/5; L 4+/5 Flexion: R 4/5; L 4+/5  Extension: R 4/5; L 4+/5         Special tests:   Positive right scour's test. Negative at the left. Also negative for sujey. [place] test.     Gait: pt ambulates on level ground with antalgia and right trunk lean.  Balance: SLS: R 20 sec, L 30 sec      Assessment: Patient presents with continued right hip tightness and guarding during his self stretches. The patient still presents with continued right le weakness and decreased rom during his table and wb exercises.      Goals: (to be met in 12 visits)  .Pt will have improved hip AROM Flex to 100 deg and ABD to 45 deg to be able to don/doff shoes and perform car transfers without difficulty   Pt will improve hip ABD and ER strength to 5/5 to increase ease with standing and walking   Pt will be able to squat to  light objects around the house with <1/10 hip pain   Pt will improve functional hip  strength to report ability to ascend/descend 1 flight of stairs reciprocally without use of handrail   Pt will demonstrate improved SLS to >30 seconds ARELY to promote safety and decrease risk of falls on uneven surfaces such as grass and gravel   Pt will be independent and compliant with comprehensive HEP to maintain progress achieved in PT      Plan: Plan to work on stretching,   Date: 6/7/2024  TX#: 2/12 Date:  6/10/24               TX#: 3/12 Date:  6/14/24               TX#: 4/12 Date:   6/17/24              TX#: 5/12 Date: 6/21/24  Tx#: 6/12 Date: 6/27/24  Tx#: 7/12 Date: 7/1/24  Tx#: 8/12 Date: 7/9/24  Tx#: 9/12 Date: 8/2/24  Tx#: 10/12   Ther. Ex.: 30'  Nu-Step 8' L5  Bilateral HS, glut., pfs, hf and skc st. 4x30\"xea.  SLR 20xea,  S/L Abd. 20xea,  Prone Hip Ext. 20xea.  Groin St. 3x30\" Ther. Ex.: 30'  Nu-Step 8' L5  Bilateral HS, glut., pfs, hf and skc st. 4x30\"xea.  Groin St. 3x30\"  Tennis Ball Self Mob. 3' Ther. Ex.: 30'  Nu-Step 8' L5  Bilateral HS, glut., pfs, hf and skc st. 4x30\"xea.  Groin St. 3x30\"  Tennis Ball Self Mob. 3' Ther. Ex.: 25'  Nu-Step 8' L5  Bilateral HS, glut., pfs, hf and skc st. 4x30\"xea.  Groin St. 3x30\"  Tennis Ball Self Mob. 3' Ther. Ex.: 25'  Nu-Step 8' L5  Bilateral HS, glut., pfs, hf and skc st. 4x30\"xea.  Groin St. 3x30\"  Tennis Ball Self Mob. 3'  DKC 3x30\" Ther. Ex.: 25'  Nu-Step 8' L5  Bilateral HS, glut., pfs, hf and skc st. 4x30\"xea.  Groin St. 3x30\"  Tennis Ball Self Mob. 3'  DKC 3x30\" Ther. Ex.: 25'  Bike 8' L5  Bilateral HS, glut., pfs, hf and skc st. 4x30\"xea.  Groin St. 3x30\"  Tennis Ball Self Mob. 3'  DKC 3x30\" Ther. Ex.: 25'  Bike 8' L5  Bilateral HS, glut., pfs, hf and skc st. 4x30\"xea.  Groin St. 3x30\"  Tennis Ball Self Mob. 3'  DKC 3x30\" Ther. Ex.: 25'  Bike 8' L5  Bilateral HS, glut., pfs, hf and skc st. 4x30\"xea.  Groin St. 3x30\"  Tennis Ball Self Mob. 3'  DKC 3x30\"   Neuro Re-ed.: 15'  Bolster Squeeze 20x3\"  Bridges 2x20  Side steps 5 Laps  Hip Swings Forward and  Lateral 20xea.  Neuro Re-ed.: 15'  Bolster Squeeze 20x3\"  Bridges 2x20  Side steps 5 Laps  Hip Swings Forward and Lateral 20xea. Neuro Re-ed.: 20'  Bolster Squeeze 20x3\"  Bridges 2x20  Side steps 5 Laps  Hip Swings Forward and Lateral 20xea.  1/4 Squats 2x20 Neuro Re-ed.: 20'  Bolster Squeeze 20x3\"  Bridges 2x20  Side steps 5 Laps  Hip Swings Forward and Lateral 20xea.  1/4 Squats 2x20 Neuro Re-ed.: 20'  Bolster Squeeze 20x3\"  Bridges 2x20  Side steps 5 Laps  Hip Swings Forward and Lateral 20xea.  1/4 Squats 2x20 Neuro Re-ed.: 20'  Bolster Squeeze 20x3\"  Bridges 2x20  Side steps 5 Laps  Hip Swings Forward and Lateral 20xea.  1/4 Squats 2x20  Walking Marching 5 Laps Neuro Re-ed.: 20'  Bolster Squeeze 20x3\"  Bridges 2x20  Side steps 5 Laps  Hip Swings Forward and Lateral 20xea.  1/4 Squats 2x20  Walking Marching 5 Laps Neuro Re-ed.: 20'  Bolster Squeeze 20x3\"  Bridges 2x20  Side steps 5 Laps  Hip Swings Forward and Lateral 20xea.  1/4 Squats 2x20  Walking Marching 5 Laps   HEP:  Did not add any new exercises to his HEP.    Charges: 2TE and 1 Neuro      Total Timed Treatment: 45 min  Total Treatment Time: 45 min

## 2024-08-05 ENCOUNTER — OFFICE VISIT (OUTPATIENT)
Dept: PHYSICAL THERAPY | Age: 44
End: 2024-08-05
Payer: COMMERCIAL

## 2024-08-05 DIAGNOSIS — R74.8 ELEVATED LIVER ENZYMES: Primary | ICD-10-CM

## 2024-08-05 DIAGNOSIS — M25.551 RIGHT HIP PAIN: Primary | ICD-10-CM

## 2024-08-05 PROCEDURE — 97112 NEUROMUSCULAR REEDUCATION: CPT

## 2024-08-05 PROCEDURE — 97110 THERAPEUTIC EXERCISES: CPT

## 2024-08-05 NOTE — PROGRESS NOTES
Diagnosis:   Right hip pain      Referring Provider: No ref. provider found  Date of Evaluation:    6/7/24    Precautions:   Stomach Sleeve Next MD visit:   none scheduled  Date of Surgery: n/a   Insurance Primary/Secondary: BCBS OUT OF STATE / N/A     # Auth Visits: 12            Subjective: Patient reports that he has continued right hip pain. He has been doing his hep and felt ok after his last visit.    Pain: 2-3/10      Objective:   Observation: Right trunk lean, hip er and pronation  Palpation: None.  Sensation: Intact sujey.     AROM: (* denotes performed with pain)  Hip   Flexion: R 93; L 100  Abduction: R 25; L 46         Accessory motion: Right hip hypomobility in all directions; +2 grades.     Flexibility:  Hip Flexor: R Tighter, L Tight  Hamstrings: R Tighter; L Tight  Piriformis: R Tighter; L Tight  Quads: R Tighter; L Tight  Gastroc-soleus: R Tighter; L Tight     Strength/MMT: (* denotes performed with pain)  Hip Knee   Flexion: R 4/5; L 4+/5  Extension: R 4/5; L 4+/5  Abduction: R 4/5; L 4+/5  ER: R 4/5; L 4+/5  IR: R 4/5; L 4+/5 Flexion: R 4/5; L 4+/5  Extension: R 4/5; L 4+/5         Special tests:   Positive right scour's test. Negative at the left. Also negative for sujey. [place] test.     Gait: pt ambulates on level ground with antalgia and right trunk lean.  Balance: SLS: R 20 sec, L 30 sec      Assessment: Patient presents with continued right hip tightness and guarding during his self stretches. The patient still presents with continued right le weakness during his strengthening exercises.      Goals: (to be met in 12 visits)  .Pt will have improved hip AROM Flex to 100 deg and ABD to 45 deg to be able to don/doff shoes and perform car transfers without difficulty   Pt will improve hip ABD and ER strength to 5/5 to increase ease with standing and walking   Pt will be able to squat to  light objects around the house with <1/10 hip pain   Pt will improve functional hip strength to report  ability to ascend/descend 1 flight of stairs reciprocally without use of handrail   Pt will demonstrate improved SLS to >30 seconds ARELY to promote safety and decrease risk of falls on uneven surfaces such as grass and gravel   Pt will be independent and compliant with comprehensive HEP to maintain progress achieved in PT      Plan: Plan to work on stretching,   Date: 6/7/2024  TX#: 2/12 Date:  6/10/24               TX#: 3/12 Date:  6/14/24               TX#: 4/12 Date:   6/17/24              TX#: 5/12 Date: 6/21/24  Tx#: 6/12 Date: 6/27/24  Tx#: 7/12 Date: 7/1/24  Tx#: 8/12 Date: 7/9/24  Tx#: 9/12 Date: 8/2/24  Tx#: 10/12 Date: 8/5/24  Tx#: 11/12   Ther. Ex.: 30'  Nu-Step 8' L5  Bilateral HS, glut., pfs, hf and skc st. 4x30\"xea.  SLR 20xea,  S/L Abd. 20xea,  Prone Hip Ext. 20xea.  Groin St. 3x30\" Ther. Ex.: 30'  Nu-Step 8' L5  Bilateral HS, glut., pfs, hf and skc st. 4x30\"xea.  Groin St. 3x30\"  Tennis Ball Self Mob. 3' Ther. Ex.: 30'  Nu-Step 8' L5  Bilateral HS, glut., pfs, hf and skc st. 4x30\"xea.  Groin St. 3x30\"  Tennis Ball Self Mob. 3' Ther. Ex.: 25'  Nu-Step 8' L5  Bilateral HS, glut., pfs, hf and skc st. 4x30\"xea.  Groin St. 3x30\"  Tennis Ball Self Mob. 3' Ther. Ex.: 25'  Nu-Step 8' L5  Bilateral HS, glut., pfs, hf and skc st. 4x30\"xea.  Groin St. 3x30\"  Tennis Ball Self Mob. 3'  DKC 3x30\" Ther. Ex.: 25'  Nu-Step 8' L5  Bilateral HS, glut., pfs, hf and skc st. 4x30\"xea.  Groin St. 3x30\"  Tennis Ball Self Mob. 3'  DKC 3x30\" Ther. Ex.: 25'  Bike 8' L5  Bilateral HS, glut., pfs, hf and skc st. 4x30\"xea.  Groin St. 3x30\"  Tennis Ball Self Mob. 3'  DKC 3x30\" Ther. Ex.: 25'  Bike 8' L5  Bilateral HS, glut., pfs, hf and skc st. 4x30\"xea.  Groin St. 3x30\"  Tennis Ball Self Mob. 3'  DKC 3x30\" Ther. Ex.: 25'  Bike 8' L5  Bilateral HS, glut., pfs, hf and skc st. 4x30\"xea.  Groin St. 3x30\"  Tennis Ball Self Mob. 3'  DKC 3x30\" Ther. Ex.: 25'  Bike 8' L5  Bilateral HS, glut., pfs, hf and skc st. 4x30\"xea.  Groin St.  3x30\"  Tennis Ball Self Mob. 3'  DKC 3x30\"   Neuro Re-ed.: 15'  Bolster Squeeze 20x3\"  Bridges 2x20  Side steps 5 Laps  Hip Swings Forward and Lateral 20xea.  Neuro Re-ed.: 15'  Bolster Squeeze 20x3\"  Bridges 2x20  Side steps 5 Laps  Hip Swings Forward and Lateral 20xea. Neuro Re-ed.: 20'  Bolster Squeeze 20x3\"  Bridges 2x20  Side steps 5 Laps  Hip Swings Forward and Lateral 20xea.  1/4 Squats 2x20 Neuro Re-ed.: 20'  Bolster Squeeze 20x3\"  Bridges 2x20  Side steps 5 Laps  Hip Swings Forward and Lateral 20xea.  1/4 Squats 2x20 Neuro Re-ed.: 20'  Bolster Squeeze 20x3\"  Bridges 2x20  Side steps 5 Laps  Hip Swings Forward and Lateral 20xea.  1/4 Squats 2x20 Neuro Re-ed.: 20'  Bolster Squeeze 20x3\"  Bridges 2x20  Side steps 5 Laps  Hip Swings Forward and Lateral 20xea.  1/4 Squats 2x20  Walking Marching 5 Laps Neuro Re-ed.: 20'  Bolster Squeeze 20x3\"  Bridges 2x20  Side steps 5 Laps  Hip Swings Forward and Lateral 20xea.  1/4 Squats 2x20  Walking Marching 5 Laps Neuro Re-ed.: 20'  Bolster Squeeze 20x3\"  Bridges 2x20  Side steps 5 Laps  Hip Swings Forward and Lateral 20xea.  1/4 Squats 2x20  Walking Marching 5 Laps Neuro Re-ed.: 20'  Bolster Squeeze 20x3\"  Bridges 2x20  GTB Side steps 5 Laps  Hip Swings Forward and Lateral 20xea.  1/4 Squats 2x20  Walking Marching 5 Laps   HEP:  Did not add any new exercises to his HEP.    Charges: 2TE and 1 Neuro      Total Timed Treatment: 45 min  Total Treatment Time: 45 min

## 2024-08-05 NOTE — PROGRESS NOTES
GI Staff:     Please schedule: Colonoscopy with MAC     Please send split dose SUTAB (unable to tolerate golytely)    Diagnosis: Screening    Medication adjustments:    amphetamine-dextroamphetamine - HOLD FOR ONE WEEK    >>>Please inform patient if new medications are started after scheduling procedure they need to call clinic to notify us.

## 2024-08-09 ENCOUNTER — OFFICE VISIT (OUTPATIENT)
Dept: PHYSICAL THERAPY | Age: 44
End: 2024-08-09
Payer: COMMERCIAL

## 2024-08-09 DIAGNOSIS — M25.551 RIGHT HIP PAIN: Primary | ICD-10-CM

## 2024-08-09 PROCEDURE — 97112 NEUROMUSCULAR REEDUCATION: CPT

## 2024-08-09 PROCEDURE — 97110 THERAPEUTIC EXERCISES: CPT

## 2024-08-09 NOTE — PROGRESS NOTES
Diagnosis:   Right hip pain      Referring Provider: No ref. provider found  Date of Evaluation:    6/7/24    Precautions:   Stomach Sleeve Next MD visit:   none scheduled  Date of Surgery: n/a   Insurance Primary/Secondary: BCBS OUT OF STATE / N/A     # Auth Visits: 12            Discharge Summary  Pt has attended 12 visits in Physical Therapy.       Subjective: Patient reports that he has decreased right hip pain. He has been doing his hep and felt ok after his last visit.    Pain: 2/10      Objective:   Observation: Right trunk lean, hip er and pronation  Palpation: None.  Sensation: Intact sujey.     AROM: (* denotes performed with pain)  Hip   Flexion: R 96; L 100  Abduction: R 35; L 46         Accessory motion: Right hip hypomobility in all directions; +2 grades.     Flexibility:  Hip Flexor: R Tighter, L Tight  Hamstrings: R Tighter; L Tight  Piriformis: R Tighter; L Tight  Quads: R Tighter; L Tight  Gastroc-soleus: R Tighter; L Tight     Strength/MMT: (* denotes performed with pain)  Hip Knee   Flexion: R 4+/5; L 4+/5  Extension: R 4+/5; L 4+/5  Abduction: R 4+/5; L 4+/5  ER: R 4/5; L 4+/5  IR: R 4/5; L 4+/5 Flexion: R 4+/5; L 4+/5  Extension: R 4+/5; L 4+/5         Special tests:   Negative for all special tests.     Gait: pt ambulates on level ground with antalgia and right trunk lean.  Balance: SLS: R 30 sec, L 30 sec      Assessment: Patient presents with decreased right hip tightness and guarding during his self stretches. The patient still presents with improved right le strength during his strengthening exercises.      Goals: (to be met in 12 visits)  .Pt will have improved hip AROM Flex to 100 deg and ABD to 45 deg to be able to don/doff shoes and perform car transfers without difficulty ALMOST MET  Pt will improve hip ABD and ER strength to 5/5 to increase ease with standing and walking ALMOST MET  Pt will be able to squat to  light objects around the house with <1/10 hip pain ALMOST MET  Pt  will improve functional hip strength to report ability to ascend/descend 1 flight of stairs reciprocally without use of handrail MET  Pt will demonstrate improved SLS to >30 seconds ARELY to promote safety and decrease risk of falls on uneven surfaces such as grass and gravel MET  Pt will be independent and compliant with comprehensive HEP to maintain progress achieved in PT  MET    Plan: Plan to work on stretching,   Date: 6/7/2024  TX#: 2/12 Date:  6/10/24               TX#: 3/12 Date:  6/14/24               TX#: 4/12 Date:   6/17/24              TX#: 5/12 Date: 6/21/24  Tx#: 6/12 Date: 6/27/24  Tx#: 7/12 Date: 7/1/24  Tx#: 8/12 Date: 7/9/24  Tx#: 9/12 Date: 8/2/24  Tx#: 10/12 Date: 8/5/24  Tx#: 11/12 Date: 8/9/24  Tx#: 12/12   Ther. Ex.: 30'  Nu-Step 8' L5  Bilateral HS, glut., pfs, hf and skc st. 4x30\"xea.  SLR 20xea,  S/L Abd. 20xea,  Prone Hip Ext. 20xea.  Groin St. 3x30\" Ther. Ex.: 30'  Nu-Step 8' L5  Bilateral HS, glut., pfs, hf and skc st. 4x30\"xea.  Groin St. 3x30\"  Tennis Ball Self Mob. 3' Ther. Ex.: 30'  Nu-Step 8' L5  Bilateral HS, glut., pfs, hf and skc st. 4x30\"xea.  Groin St. 3x30\"  Tennis Ball Self Mob. 3' Ther. Ex.: 25'  Nu-Step 8' L5  Bilateral HS, glut., pfs, hf and skc st. 4x30\"xea.  Groin St. 3x30\"  Tennis Ball Self Mob. 3' Ther. Ex.: 25'  Nu-Step 8' L5  Bilateral HS, glut., pfs, hf and skc st. 4x30\"xea.  Groin St. 3x30\"  Tennis Ball Self Mob. 3'  DKC 3x30\" Ther. Ex.: 25'  Nu-Step 8' L5  Bilateral HS, glut., pfs, hf and skc st. 4x30\"xea.  Groin St. 3x30\"  Tennis Ball Self Mob. 3'  DKC 3x30\" Ther. Ex.: 25'  Bike 8' L5  Bilateral HS, glut., pfs, hf and skc st. 4x30\"xea.  Groin St. 3x30\"  Tennis Ball Self Mob. 3'  DKC 3x30\" Ther. Ex.: 25'  Bike 8' L5  Bilateral HS, glut., pfs, hf and skc st. 4x30\"xea.  Groin St. 3x30\"  Tennis Ball Self Mob. 3'  DKC 3x30\" Ther. Ex.: 25'  Bike 8' L5  Bilateral HS, glut., pfs, hf and skc st. 4x30\"xea.  Groin St. 3x30\"  Tennis Ball Self Mob. 3'  DKC 3x30\" Ther. Ex.:  25'  Bike 8' L5  Bilateral HS, glut., pfs, hf and skc st. 4x30\"xea.  Groin St. 3x30\"  Tennis Ball Self Mob. 3'  DKC 3x30\" Ther. Ex.: 25'  Bike 8' L5  Bilateral HS, glut., pfs, hf and skc st. 4x30\"xea.  Groin St. 3x30\"  Tennis Ball Self Mob. 3'  DKC 3x30\"   Neuro Re-ed.: 15'  Bolster Squeeze 20x3\"  Bridges 2x20  Side steps 5 Laps  Hip Swings Forward and Lateral 20xea.  Neuro Re-ed.: 15'  Bolster Squeeze 20x3\"  Bridges 2x20  Side steps 5 Laps  Hip Swings Forward and Lateral 20xea. Neuro Re-ed.: 20'  Bolster Squeeze 20x3\"  Bridges 2x20  Side steps 5 Laps  Hip Swings Forward and Lateral 20xea.  1/4 Squats 2x20 Neuro Re-ed.: 20'  Bolster Squeeze 20x3\"  Bridges 2x20  Side steps 5 Laps  Hip Swings Forward and Lateral 20xea.  1/4 Squats 2x20 Neuro Re-ed.: 20'  Bolster Squeeze 20x3\"  Bridges 2x20  Side steps 5 Laps  Hip Swings Forward and Lateral 20xea.  1/4 Squats 2x20 Neuro Re-ed.: 20'  Bolster Squeeze 20x3\"  Bridges 2x20  Side steps 5 Laps  Hip Swings Forward and Lateral 20xea.  1/4 Squats 2x20  Walking Marching 5 Laps Neuro Re-ed.: 20'  Bolster Squeeze 20x3\"  Bridges 2x20  Side steps 5 Laps  Hip Swings Forward and Lateral 20xea.  1/4 Squats 2x20  Walking Marching 5 Laps Neuro Re-ed.: 20'  Bolster Squeeze 20x3\"  Bridges 2x20  Side steps 5 Laps  Hip Swings Forward and Lateral 20xea.  1/4 Squats 2x20  Walking Marching 5 Laps Neuro Re-ed.: 20'  Bolster Squeeze 20x3\"  Bridges 2x20  GTB Side steps 5 Laps  Hip Swings Forward and Lateral 20xea.  1/4 Squats 2x20  Walking Marching 5 Laps Neuro Re-ed.: 20'  Bolster Squeeze 20x3\"  Bridges 2x20  GTB Side steps 5 Laps  Hip Swings Forward and Lateral 20xea.  1/4 Squats 2x20  Walking Marching 5 Laps   HEP:  Reviewed his final HEP.    Charges: 2TE and 1 Neuro      Total Timed Treatment: 45 min  Total Treatment Time: 45 min     .Post LEFS Score  Post LEFS Score: 93.75 % (8/9/2024  9:43 AM)    12.5 % improvement    Plan: Recommend discharge secondary to ind. With his  HEP.    Patient/Family/Caregiver was advised of these findings, precautions, and treatment options and has agreed to actively participate in planning and for this course of care.    Thank you for your referral. If you have any questions, please contact me at Dept: [phone].    Sincerely,  Electronically signed by therapist: Dashawn Quintana PT , DPT, CSCS    Physician's certification required:  No  Please co-sign or sign and return this letter via fax as soon as possible to [phone].   I certify the need for these services furnished under this plan of treatment and while under my care.    X___________________________________________________ Date____________________    Certification From: 8/9/2024  To:11/7/2024

## 2024-08-28 ENCOUNTER — HOSPITAL ENCOUNTER (OUTPATIENT)
Dept: ULTRASOUND IMAGING | Facility: HOSPITAL | Age: 44
Discharge: HOME | End: 2024-08-28
Attending: NURSE PRACTITIONER
Payer: COMMERCIAL

## 2024-08-28 DIAGNOSIS — I83.93 ASYMPTOMATIC VARICOSE VEINS OF BOTH LOWER EXTREMITIES: ICD-10-CM

## 2024-08-28 PROCEDURE — 93970 EXTREMITY STUDY: CPT | Performed by: NURSE PRACTITIONER

## 2024-09-10 ENCOUNTER — APPOINTMENT (OUTPATIENT)
Dept: PHYSICAL THERAPY | Age: 44
End: 2024-09-10
Payer: COMMERCIAL

## 2024-09-25 ENCOUNTER — PATIENT MESSAGE (OUTPATIENT)
Dept: FAMILY MEDICINE CLINIC | Facility: CLINIC | Age: 44
End: 2024-09-25

## 2024-09-26 NOTE — TELEPHONE ENCOUNTER
Aishwarya please see patient Mychart message below. Thank you     From: Noam Pride  To: Aishwarya Mendiola  Sent: 9/25/2024  9:00 PM CDT  Subject: Question regarding insurance issues     My psychiatrist switched me to a patch as I was having issues with adderall not being as effective. The patch has been working better for me.     The insurance company won’t cover the full cost of the patch as I don’t have any history of taking extended release. The reason I can’t take extended release is due to my duodenal switch surgery back in 2012. I called the surgeon who did the operation (Steven Monahan of [facility]) & he doesn’t have any surgical documentation since it’s been over 10 years.     Is there any way to get a note that my psychiatrist can submit to insurance to appeal their denial? The prescriber’s name is Talita Allne. Her fax is  & phone is [phone]

## 2024-10-02 ENCOUNTER — TELEPHONE (OUTPATIENT)
Dept: FAMILY MEDICINE CLINIC | Facility: CLINIC | Age: 44
End: 2024-10-02

## 2024-10-02 NOTE — TELEPHONE ENCOUNTER
letter    From  MARIELOS Slade To  Noam Pride Sent and Delivered  9/26/2024 10:11 AM       Noam-I placed a letter for your psychiatrist. Please let me know if you need any further help.  FLORENCE Solo, FNP-C      [address]    [employer] User Last Read On   Noam Pride Not Read           Left detailed message- ok per release of information. Asked him to view letter and let us know if any questions/concerns.

## 2024-12-03 RX ORDER — DEXTROAMPHETAMINE 18 MG/1
PATCH, EXTENDED RELEASE TRANSDERMAL
COMMUNITY

## 2024-12-03 NOTE — PAT NURSING NOTE
Spoke with Dr. Morejon, anesthesiologist, hold Xelstrym patch 72 hours before procedure on 12/11/2024.

## 2024-12-11 ENCOUNTER — ANESTHESIA EVENT (OUTPATIENT)
Dept: ENDOSCOPY | Age: 44
End: 2024-12-11
Payer: COMMERCIAL

## 2024-12-11 ENCOUNTER — ANESTHESIA (OUTPATIENT)
Dept: ENDOSCOPY | Age: 44
End: 2024-12-11
Payer: COMMERCIAL

## 2024-12-11 ENCOUNTER — HOSPITAL ENCOUNTER (OUTPATIENT)
Age: 44
Setting detail: HOSPITAL OUTPATIENT SURGERY
Discharge: HOME | End: 2024-12-11
Attending: INTERNAL MEDICINE | Admitting: INTERNAL MEDICINE
Payer: COMMERCIAL

## 2024-12-11 DIAGNOSIS — Z12.11 COLON CANCER SCREENING: ICD-10-CM

## 2024-12-11 PROBLEM — K64.8 INTERNAL HEMORRHOIDS: Status: ACTIVE | Noted: 2024-12-11

## 2024-12-11 PROCEDURE — 99070 SPECIAL SUPPLIES PHYS/QHP: CPT | Performed by: INTERNAL MEDICINE

## 2024-12-11 PROCEDURE — 45378 DIAGNOSTIC COLONOSCOPY: CPT | Performed by: INTERNAL MEDICINE

## 2024-12-11 RX ORDER — GLYCOPYRROLATE 0.2 MG/ML
INJECTION, SOLUTION INTRAMUSCULAR; INTRAVENOUS AS NEEDED
Status: DISCONTINUED | OUTPATIENT
Start: 2024-12-11 | End: 2024-12-11 | Stop reason: SURG

## 2024-12-11 RX ORDER — SODIUM CHLORIDE, SODIUM LACTATE, POTASSIUM CHLORIDE, CALCIUM CHLORIDE 600; 310; 30; 20 MG/100ML; MG/100ML; MG/100ML; MG/100ML
INJECTION, SOLUTION INTRAVENOUS CONTINUOUS
Status: DISCONTINUED | OUTPATIENT
Start: 2024-12-11 | End: 2024-12-11

## 2024-12-11 RX ORDER — LIDOCAINE HYDROCHLORIDE 10 MG/ML
INJECTION, SOLUTION EPIDURAL; INFILTRATION; INTRACAUDAL; PERINEURAL AS NEEDED
Status: DISCONTINUED | OUTPATIENT
Start: 2024-12-11 | End: 2024-12-11 | Stop reason: SURG

## 2024-12-11 RX ADMIN — GLYCOPYRROLATE 0.2 MG: 0.2 INJECTION, SOLUTION INTRAMUSCULAR; INTRAVENOUS at 08:04:00

## 2024-12-11 RX ADMIN — LIDOCAINE HYDROCHLORIDE 50 MG: 10 INJECTION, SOLUTION EPIDURAL; INFILTRATION; INTRACAUDAL; PERINEURAL at 08:04:00

## 2024-12-11 NOTE — ANESTHESIA POSTPROCEDURE EVALUATION
Patient: Noam Pride    Procedure Summary       Date: 12/11/24 Room / Location: [facility] ENDOSCOPY 01 / [facility] ENDO    Anesthesia Start: 0804 Anesthesia Stop: 0817    Procedure: COLONOSCOPY Diagnosis:       Colon cancer screening      (Poor prep)    Surgeons: GREGORY Thurman MD Anesthesiologist: Clovis Longoria MD    Anesthesia Type: MAC ASA Status: 2            Anesthesia Type: MAC    Vitals Value Taken Time   /81 12/11/24 0817   Temp  12/11/24 0817   Pulse 57 12/11/24 0817   Resp 14 12/11/24 0817   SpO2 96 % 12/11/24 0817       [facility] AN Post Evaluation:   Patient Evaluated in PACU  Level of Consciousness: sleepy but conscious  Pain Management: adequate  Airway Patency:patent  Yes    Nausea/Vomiting: none  Cardiovascular Status: acceptable  Respiratory Status: acceptable  Postoperative Hydration acceptable      Clovis Longoria MD  12/11/2024 8:17 AM

## 2024-12-11 NOTE — DISCHARGE INSTRUCTIONS
Home Care Instructions for Colonoscopy with Sedation    Diet:  - Resume your regular diet as tolerated unless otherwise instructed.  - Start with light meals to minimize bloating.  - Do not drink alcohol today.    Medication:  - If you have questions about resuming your normal medications, please contact your Primary Care Physician.    Activities:  - Take it easy today. Do not return to work today.  - Do not drive today.  - Do not operate any machinery today (including kitchen equipment).    Colonoscopy:  - You may notice some rectal \"spotting\" (a little blood on the toilet tissue) for a day or two after the exam. This is normal.  - If you experience any rectal bleeding (not spotting), persistent tenderness or sharp severe abdominal pains, oral temperature over 100 degrees Fahrenheit, light-headedness or dizziness, or any other problems, contact your doctor.      **If unable to reach your doctor, please go to the [facility] Emergency Room**    - Your referring physician will receive a full report of your examination.  - If you do not hear from your doctor's office within two weeks of your biopsy, please call them for your results.    You may be able to see your laboratory results in [employer] between 4 and 7 business days.  In some cases, your physician may not have viewed the results before they are released to [employer].  If you have questions regarding your results contact the physician who ordered the test/exam by phone or via [facility] by choosing \"Ask a Medical Question.\"

## 2024-12-11 NOTE — ANESTHESIA PREPROCEDURE EVALUATION
Anesthesia PreOp Note    HPI:     Noam Pride is a 44 year old male who presents for preoperative consultation requested by: GREGORY Thurman MD    Date of Surgery: 12/11/2024    Procedure(s):  COLONOSCOPY  Indication: Colon cancer screening    Relevant Problems   No relevant active problems       NPO:  Last Liquid Consumption Date: 12/11/24  Last Liquid Consumption Time: 0500  Last Solid Consumption Date: 12/10/24  Last Solid Consumption Time: 1400  Last Liquid Consumption Date: 12/11/24          History Review:  Patient Active Problem List    Diagnosis Date Noted    Asymptomatic varicose veins of both lower extremities 07/10/2024    Screening for colon cancer 07/10/2024    Right hip pain 03/20/2024    Elevated liver enzymes 01/22/2023    Well adult exam 01/12/2023    H/O gastric bypass 01/12/2023    Fluctuating blood pressure 01/12/2023    Attention deficit disorder (ADD) without hyperactivity     Rosacea     Obesity (BMI 30.0-34.9)        Past Medical History:    Attention deficit disorder without mention of hyperactivity    Calculus of gallbladder without mention of cholecystitis or obstruction    History of bariatric surgery    Hyperglycemia    on fasting bloodwork    Hypertriglyceridemia    Lipid screening    Morbid obesity (HCC)    Rosacea    Unspecified essential hypertension       Past Surgical History:   Procedure Laterality Date    Cholecystectomy      Gastroplasty duodenal switch  2012    [facility] - Steven Monahan M.D.    Hand/finger surgery unlisted  01/01/1994    Laparoscopic cholecystectomy  02/14/2008    Middle ear surgery proc unlisted  as a child    tubes in ears-    Tonsillectomy  01/01/1988    w/adnoids       Prescriptions Prior to Admission[1]  Current Medications and Prescriptions Ordered in Epic[2]    Allergies[3]    Family History   Problem Relation Age of Onset    Other (HTN[other]) Mother     Hypertension Mother     Heart Attack Maternal Grandmother     Colon  Cancer Neg      Social History     Socioeconomic History    Marital status:    Tobacco Use    Smoking status: Never    Smokeless tobacco: Never   Vaping Use    Vaping status: Never Used   Substance and Sexual Activity    Alcohol use: Not Currently    Drug use: No   Other Topics Concern    Caffeine Concern Yes     Comment: Coffee; Eneregy drinks    Exercise Yes     Comment: walks       Available pre-op labs reviewed.             Vital Signs:  Body mass index is 30.23 kg/m².   height is 1.702 m (5' 7\") and weight is 87.5 kg (193 lb). His pulse is 65.   Vitals:    12/03/24 1631 12/11/24 0725   Pulse:  65   Weight: 87.5 kg (193 lb)    Height: 1.702 m (5' 7\")         Anesthesia Evaluation     Patient summary reviewed and Nursing notes reviewed    No history of anesthetic complications   Airway   Mallampati: II  TM distance: >3 FB  Neck ROM: full  Dental - Dentition appears grossly intact     Pulmonary - negative ROS and normal exam   Cardiovascular - normal exam  (+) hypertension    Neuro/Psych    (+)   attention deficit disorder      GI/Hepatic/Renal    (+) bowel prep    Endo/Other - negative ROS   Abdominal                  Anesthesia Plan:   ASA:  2  Plan:   MAC  Informed Consent Plan and Risks Discussed With:  Patient      I have informed Noam Pride and/or legal guardian or family member of the nature of the anesthetic plan, benefits, risks including possible dental damage if relevant, major complications, and any alternative forms of anesthetic management.   All of the patient's questions were answered to the best of my ability. The patient desires the anesthetic management as planned.  Clovis Longoria MD  12/11/2024 7:57 AM  Present on Admission:  **None**           [1]   Medications Prior to Admission   Medication Sig Dispense Refill Last Dose/Taking    Dextroamphetamine (XELSTRYM) 18 MG/9HR Transdermal Patch Place onto the skin.   Taking    ORACEA 40 MG Oral Capsule Delayed Release Take 1  capsule by mouth daily.   12/8/2024    amphetamine-dextroamphetamine 20 MG Oral Tab 1 tablet (20 mg total). (Patient not taking: Reported on 12/3/2024)   Not Taking   [2]   Current Facility-Administered Medications Ordered in Epic   Medication Dose Route Frequency Provider Last Rate Last Admin    lactated ringers infusion   Intravenous Continuous GREGORY Thurman MD         No current [facility]-ordered outpatient medications on file.   [3] No Known Allergies     None felt

## 2024-12-11 NOTE — OPERATIVE REPORT
COLONOSCOPY REPORT    Noam Pride     1980 Age 44 year old   PCP No primary care provider on file. Endoscopist Ana Thurman MD     Date of procedure: 24    Procedure: Colonoscopy     Pre-operative diagnosis: Screening    Post-operative diagnosis: Poor preparation, internal hemorrhoids    Medications: MAC      Procedure:  Informed consent was obtained from the patient after the risks of the procedure were discussed, including but not limited to bleeding, perforation, aspiration, infection, or possibility of a missed lesion. After discussions of the risks/benefits and alternatives to this procedure, as well as the planned sedation, the patient was placed in the left lateral decubitus position and begun on continuous blood pressure pulse oximetry and EKG monitoring and this was maintained throughout the procedure. Once an adequate level of sedation was obtained a digital rectal exam was completed. Then the lubricated tip of the [id number] diagnostic video colonoscope was inserted and advanced to the sigmoid colon. The bowel prep was POOR. Views of the colon were POOR with washing. I then carefully withdrew the instrument from the patient who tolerated the procedure well.     Complications: none.    Findings:   1. Poor prep- unable to finish procedure.    2. Diverticulosis: not seen but may be present.    3. Small internal hemorrhoids.    Impression:   Poor prep - incomplete colonoscopy. Unable to reach end of colon, so polyps/lesions may be present that were not seen.    Recommend:  Repeat CLN with better bowel prep.  Do not eat the day before procedure, follow instructions on bowel preparation carefully.  Recommend enhanced bowel prep for next colonoscopy (3 days of dulcolax + bowel prep). Patient should be reminded NOT to consume seeds, nuts, corn, popcorn or raw fruits/vegetables for 5 days prior to next colonoscopy.     Specimens: none  Blood loss: <1 ml

## 2024-12-11 NOTE — H&P
History & Physical Examination    Patient Name: Noam Pride  MRN: [id number]  [facility]: [id number]  YOB: 1980    Diagnosis: screening for colon cancer    Patient currently denies any GI symptoms of nausea, vomiting, dyspepsia, dysphagia, hematemesis, abdominal pain, change in bowel habits, thin stools, hematochezia, or melena.  Additionally there is no weight loss and no reported history of chest pain or shortness of breath.    No family hx of crc    Prescriptions Prior to Admission[1]  Current Facility-Administered Medications   Medication Dose Route Frequency    lactated ringers infusion   Intravenous Continuous     Facility-Administered Medications Ordered in Other Encounters   Medication Dose Route Frequency    lidocaine PF (Xylocaine-MPF) 1% injection   Intravenous PRN    glycopyrrolate (Robinul) 0.2 MG/ML injection   Intravenous PRN       Allergies: Allergies[2]    Past Medical History:    Attention deficit disorder without mention of hyperactivity    Calculus of gallbladder without mention of cholecystitis or obstruction    History of bariatric surgery    Hyperglycemia    on fasting bloodwork    Hypertriglyceridemia    Lipid screening    Morbid obesity (HCC)    Rosacea    Unspecified essential hypertension     Past Surgical History:   Procedure Laterality Date    Cholecystectomy      Gastroplasty duodenal switch  2012    [facility] - Steven Monahan M.D.    Hand/finger surgery unlisted  01/01/1994    Laparoscopic cholecystectomy  02/14/2008    Middle ear surgery proc unlisted  as a child    tubes in ears-    Tonsillectomy  01/01/1988    w/denys     Family History   Problem Relation Age of Onset    Other (HTN[other]) Mother     Hypertension Mother     Heart Attack Maternal Grandmother     Colon Cancer Neg      Social History     Tobacco Use    Smoking status: Never    Smokeless tobacco: Never   Substance Use Topics    Alcohol use: Not Currently       SYSTEM Check if Review  is Normal Check if Physical Exam is Normal If not normal, please explain:   HEENT [X ] [ X]    NECK  [X ] [ X]    HEART [X ] [ X]    LUNGS [X ] [ X]    ABDOMEN [X ] [ X]    EXTREMITIES [X ] [ X]    OTHER        I have discussed the risks and benefits and alternatives of the procedure with the patient/family.  They understand and agree to proceed with plan of care.   I have reviewed the History and Physical done within the last 30 days.  Any changes noted above.    BUCKY Thurman MD  [facility] - Gastroenterology  12/11/2024  8:07 AM                 [1]   Medications Prior to Admission   Medication Sig Dispense Refill Last Dose/Taking    Dextroamphetamine (XELSTRYM) 18 MG/9HR Transdermal Patch Place onto the skin.   Taking    ORACEA 40 MG Oral Capsule Delayed Release Take 1 capsule by mouth daily.   12/8/2024    amphetamine-dextroamphetamine 20 MG Oral Tab 1 tablet (20 mg total). (Patient not taking: Reported on 12/3/2024)   Not Taking   [2] No Known Allergies

## 2024-12-12 VITALS
HEART RATE: 62 BPM | SYSTOLIC BLOOD PRESSURE: 129 MMHG | HEIGHT: 67 IN | BODY MASS INDEX: 30.29 KG/M2 | RESPIRATION RATE: 13 BRPM | OXYGEN SATURATION: 98 % | DIASTOLIC BLOOD PRESSURE: 85 MMHG | WEIGHT: 193 LBS

## 2024-12-23 ENCOUNTER — TELEPHONE (OUTPATIENT)
Facility: CLINIC | Age: 44
End: 2024-12-23

## 2024-12-23 DIAGNOSIS — Z12.11 SCREEN FOR COLON CANCER: Primary | ICD-10-CM

## 2024-12-24 RX ORDER — SOD SULF/POT CHLORIDE/MAG SULF 1.479 G
1 TABLET ORAL AS DIRECTED
Qty: 24 TABLET | Refills: 0 | Status: SHIPPED | OUTPATIENT
Start: 2024-12-24 | End: 2024-12-25

## 2024-12-24 NOTE — TELEPHONE ENCOUNTER
GI Scheduling Staff:   Please call patient to reschedule CLN, poor prep.    Recommend enhanced bowel prep for next colonoscopy (3 days of dulcolax + bowel prep). Patient should be reminded NOT to consume seeds, nuts, corn, popcorn or raw fruits/vegetables for 5 days prior to next colonoscopy.   Please go over prep in detail with him again, rosie

## 2024-12-24 NOTE — TELEPHONE ENCOUNTER
Dr. Thurman-   Patient is requesting to do sutab again for this procedure. If appropriate, please send to the [facility] on patients chart.     I went into detail in regards to the prep and patient ate before the last procedure and did not follow our diet restrictions. Patient understands what to do for next procedure.    Patient wears a transdermal patch for ADHD. Please advise on how long patient needs to stop that medication.   Thank you!

## 2024-12-24 NOTE — TELEPHONE ENCOUNTER
Orders from Nurse Only 8/1/24 :  Please schedule: Colonoscopy with MAC   Please send split dose SUTAB (unable to tolerate golytely)  Diagnosis: Screening  Medication adjustments  amphetamine-dextroamphetamine - HOLD FOR ONE WEEK

## 2024-12-24 NOTE — TELEPHONE ENCOUNTER
Scheduled for:  Colonoscopy [id number]   Provider Name:  Dr. Thurman   Date:  4/16/2025  Location:    [facility]  Sedation:  mac  Time:  10:35 (pt is aware that ENDO will call the day before to confirm arrival time)  Prep:  sutab and 3 days of dulcolax   Meds/Allergies Reconciled?:  Physician reviewed   Diagnosis with codes:  Colon cancer screening Z12.11  Was patient informed to call insurance with codes (Y/N):  Yes, I confirmed [employer] insurance with the patient.   Referral sent?:  Referral was sent at the time of electronic surgical scheduling.  EM or [facility] notified?:  I sent an electronic request to Endo Scheduling and received a confirmation today.   Medication Orders:  This patient verbally confirmed that he is not taking:   Iron, blood thinners, BP meds, and is not diabetic   Not latex allergy, Not PCN allergy and does not have a pacemaker  Misc Orders:  I discussed the prep instructions with the patient which he verbally understood and is aware that I will mychart the instructions today.       Further instructions given by staff:

## 2024-12-24 NOTE — TELEPHONE ENCOUNTER
GI Scheduling Staff:   Okay for sutab, sent to pharmacy.    Transdermal patch should be held for 1 week before procedure, thanks

## 2025-04-16 ENCOUNTER — ANESTHESIA (OUTPATIENT)
Dept: ENDOSCOPY | Age: 45
End: 2025-04-16
Payer: COMMERCIAL

## 2025-04-16 ENCOUNTER — HOSPITAL ENCOUNTER (OUTPATIENT)
Age: 45
Setting detail: HOSPITAL OUTPATIENT SURGERY
Discharge: HOME | End: 2025-04-16
Attending: INTERNAL MEDICINE | Admitting: INTERNAL MEDICINE
Payer: COMMERCIAL

## 2025-04-16 ENCOUNTER — ANESTHESIA EVENT (OUTPATIENT)
Dept: ENDOSCOPY | Age: 45
End: 2025-04-16
Payer: COMMERCIAL

## 2025-04-16 VITALS
BODY MASS INDEX: 28.41 KG/M2 | HEART RATE: 44 BPM | SYSTOLIC BLOOD PRESSURE: 128 MMHG | HEIGHT: 67 IN | WEIGHT: 181 LBS | OXYGEN SATURATION: 100 % | RESPIRATION RATE: 14 BRPM | DIASTOLIC BLOOD PRESSURE: 74 MMHG

## 2025-04-16 DIAGNOSIS — Z12.11 SCREEN FOR COLON CANCER: ICD-10-CM

## 2025-04-16 PROCEDURE — 45378 DIAGNOSTIC COLONOSCOPY: CPT | Performed by: INTERNAL MEDICINE

## 2025-04-16 RX ORDER — NALOXONE HYDROCHLORIDE 0.4 MG/ML
0.08 INJECTION, SOLUTION INTRAMUSCULAR; INTRAVENOUS; SUBCUTANEOUS ONCE AS NEEDED
Status: DISCONTINUED | OUTPATIENT
Start: 2025-04-16 | End: 2025-04-16

## 2025-04-16 RX ORDER — SODIUM CHLORIDE, SODIUM LACTATE, POTASSIUM CHLORIDE, CALCIUM CHLORIDE 600; 310; 30; 20 MG/100ML; MG/100ML; MG/100ML; MG/100ML
INJECTION, SOLUTION INTRAVENOUS CONTINUOUS
Status: DISCONTINUED | OUTPATIENT
Start: 2025-04-16 | End: 2025-04-16

## 2025-04-16 RX ORDER — SODIUM CHLORIDE 9 MG/ML
INJECTION, SOLUTION INTRAVENOUS CONTINUOUS PRN
Status: DISCONTINUED | OUTPATIENT
Start: 2025-04-16 | End: 2025-04-16 | Stop reason: SURG

## 2025-04-16 RX ADMIN — SODIUM CHLORIDE: 9 INJECTION, SOLUTION INTRAVENOUS at 09:49:00

## 2025-04-16 NOTE — ANESTHESIA PREPROCEDURE EVALUATION
Anesthesia PreOp Note    HPI:     Noam Pride is a 45 year old male who presents for preoperative consultation requested by: GREGORY Thurman MD    Date of Surgery: 4/16/2025    Procedure(s):  COLONOSCOPY  Indication: Screen for colon cancer    Relevant Problems   No relevant active problems       NPO:  Last Liquid Consumption Date: 04/16/25  Last Liquid Consumption Time: 0200  Last Solid Consumption Date: 04/15/25  Last Solid Consumption Time: 0900  Last Liquid Consumption Date: 04/16/25          History Review:  Patient Active Problem List    Diagnosis Date Noted    Internal hemorrhoids 12/11/2024    Asymptomatic varicose veins of both lower extremities 07/10/2024    Screening for colon cancer 07/10/2024    Right hip pain 03/20/2024    Elevated liver enzymes 01/22/2023    Well adult exam 01/12/2023    H/O gastric bypass 01/12/2023    Fluctuating blood pressure 01/12/2023    Attention deficit disorder (ADD) without hyperactivity     Rosacea     Obesity (BMI 30.0-34.9)        Past Medical History[1]    Past Surgical History[2]    Prescriptions Prior to Admission[3]  Current Medications and Prescriptions Ordered in Epic[4]    Allergies[5]    Family History[6]  Social Hx on file[7]    Available pre-op labs reviewed.             Vital Signs:  Body mass index is 28.35 kg/m².   height is 1.702 m (5' 7\") and weight is 82.1 kg (181 lb). His blood pressure is 129/79 and his pulse is 46 (abnormal). His respiration is 11 and oxygen saturation is 100%.   Vitals:    04/10/25 1559 04/16/25 0900   BP:  129/79   Pulse:  (!) 46   Resp:  11   SpO2:  100%   Weight: 82.1 kg (181 lb)    Height: 1.702 m (5' 7\")         Anesthesia Evaluation      Airway   Mallampati: I  TM distance: >3 FB  Neck ROM: full  Dental - Dentition appears grossly intact     Pulmonary - normal exam    breath sounds clear to auscultation  Cardiovascular - normal exam  (+) hypertension  (-) murmur    Rhythm: regular  Rate: normal    Neuro/Psych    (+)    attention deficit disorder      GI/Hepatic/Renal - negative ROS     Endo/Other - negative ROS   Abdominal  - normal exam                 Anesthesia Plan:   ASA:  2  Plan Comments: Anesthesia plan was discussed with the patient, going through the rationale, expectations, benefits and risks namely injury to lips, teeth, gyms or corneas, risks of an allergic reaction, risk of awareness or recall of intra-operative events, risk of prolonged intubation and ICU admission, risks of kidney failure, risk of coronary events or dysrhythmias, risk of cerebrovascular events or stroke and on rare occasions death.         I have informed Noam Pride and/or legal guardian or family member of the nature of the anesthetic plan, benefits, risks including possible dental damage if relevant, major complications, and any alternative forms of anesthetic management.   All of the patient's questions were answered to the best of my ability. The patient desires the anesthetic management as planned.  Evan Zapata MD  4/16/2025 9:15 AM  Present on Admission:  **None**           [1]   Past Medical History:   Attention deficit disorder without mention of hyperactivity    Calculus of gallbladder without mention of cholecystitis or obstruction    History of bariatric surgery    Hyperglycemia    on fasting bloodwork    Hypertriglyceridemia    Lipid screening    Morbid obesity (HCC)    Rosacea    Unspecified essential hypertension   [2]   Past Surgical History:  Procedure Laterality Date    Cholecystectomy      Colonoscopy N/A 12/11/2024    Procedure: COLONOSCOPY;  Surgeon: GREGORY Thurman MD;  Location: [facility] ENDO    Gastroplasty duodenal switch  2012    [facility] - Steven Monahan M.D.    Hand/finger surgery unlisted  01/01/1994    Laparoscopic cholecystectomy  02/14/2008    Middle ear surgery proc unlisted  as a child    tubes in ears-    Tonsillectomy  01/01/1988    w/adnoids   [3]   Medications Prior to  Admission   Medication Sig Dispense Refill Last Dose/Taking    Dextroamphetamine (XELSTRYM) 18 MG/9HR Transdermal Patch Place onto the skin.   4/14/2025    ORACEA 40 MG Oral Capsule Delayed Release Take 1 capsule by mouth as needed.   Taking As Needed   [4]   Current Facility-Administered Medications Ordered in Epic   Medication Dose Route Frequency Provider Last Rate Last Admin    lactated ringers infusion   Intravenous Continuous GREGORY Thurman MD         No current [facility]-ordered outpatient medications on file.   [5] No Known Allergies  [6]   Family History  Problem Relation Age of Onset    Other (HTN[other]) Mother     Hypertension Mother     Heart Attack Maternal Grandmother     Colon Cancer Neg    [7]   Social History  Socioeconomic History    Marital status:    Tobacco Use    Smoking status: Never    Smokeless tobacco: Never   Vaping Use    Vaping status: Never Used   Substance and Sexual Activity    Alcohol use: Yes     Comment: rare    Drug use: No   Other Topics Concern    Caffeine Concern Yes     Comment: Coffee; Eneregy drinks    Exercise Yes     Comment: walks

## 2025-04-16 NOTE — ANESTHESIA POSTPROCEDURE EVALUATION
Patient: Noam Pride    Procedure Summary       Date: 04/16/25 Room / Location: [facility] ENDOSCOPY 01 / [facility] ENDO    Anesthesia Start: 0945 Anesthesia Stop: 1023    Procedure: COLONOSCOPY Diagnosis:       Screen for colon cancer      (Tortorous Colon, Hemorrhoids)    Surgeons: GREGORY Thurman MD Anesthesiologist: Evan Zapata MD    Anesthesia Type: Not recorded ASA Status: Not recorded            Anesthesia Type: No value filed.    Vitals Value Taken Time   /75 04/16/25 10:20   Temp 98.6 04/16/25 10:23   Pulse 43 04/16/25 10:22   Resp 13 04/16/25 10:22   SpO2 99 % 04/16/25 10:22   Vitals shown include unfiled device data.    [facility] AN Post Evaluation:   Patient Evaluated in PACU  Patient Participation: complete - patient participated  Level of Consciousness: awake  Pain Score: 2  Pain Management: adequate  Airway Patency:patent  Dental exam unchanged from preop  Yes    Cardiovascular Status: hemodynamically stable  Respiratory Status: spontaneous ventilation  Postoperative Hydration euvolemic      Evan Zapata MD  4/16/2025 10:23 AM

## 2025-04-16 NOTE — H&P
History & Physical Examination    Patient Name: Noam Pride  MRN: [id number]  CSN: [id number]  YOB: 1980    Diagnosis: screening for colon cancer    Prescriptions Prior to Admission[1]  Current Hospital Medications[2]    Allergies: Allergies[3]    Past Medical History[4]  Past Surgical History[5]  Family History[6]  Social History     Tobacco Use    Smoking status: Never    Smokeless tobacco: Never   Substance Use Topics    Alcohol use: Yes     Comment: rare       SYSTEM Check if Review is Normal Check if Physical Exam is Normal If not normal, please explain:   HEENT [X ] [ X]    NECK  [X ] [ X]    HEART [X ] [ X]    LUNGS [X ] [ X]    ABDOMEN [X ] [ X]    EXTREMITIES [X ] [ X]    OTHER        I have discussed the risks and benefits and alternatives of the procedure with the patient/family.  They understand and agree to proceed with plan of care.   I have reviewed the History and Physical done within the last 30 days.  Any changes noted above.    BUCKY Thurman MD  [facility] - Gastroenterology  4/16/2025  9:38 AM                 [1]   Medications Prior to Admission   Medication Sig Dispense Refill Last Dose/Taking    Dextroamphetamine (XELSTRYM) 18 MG/9HR Transdermal Patch Place onto the skin.   4/14/2025    ORACEA 40 MG Oral Capsule Delayed Release Take 1 capsule by mouth as needed.   Taking As Needed   [2]   Current Facility-Administered Medications   Medication Dose Route Frequency    lactated ringers infusion   Intravenous Continuous   [3] No Known Allergies  [4]   Past Medical History:   Attention deficit disorder without mention of hyperactivity    Calculus of gallbladder without mention of cholecystitis or obstruction    History of bariatric surgery    Hyperglycemia    on fasting bloodwork    Hypertriglyceridemia    Lipid screening    Morbid obesity (HCC)    Rosacea    Unspecified essential hypertension   [5]   Past Surgical History:  Procedure Laterality Date    Cholecystectomy       Colonoscopy N/A 12/11/2024    Procedure: COLONOSCOPY;  Surgeon: GREGORY Thurman MD;  Location: [facility] ENDO    Gastroplasty duodenal switch  2012    [facility] - Steven Monahan M.D.    Hand/finger surgery unlisted  01/01/1994    Laparoscopic cholecystectomy  02/14/2008    Middle ear surgery proc unlisted  as a child    tubes in ears-    Tonsillectomy  01/01/1988    w/adnoids   [6]   Family History  Problem Relation Age of Onset    Other (HTN[other]) Mother     Hypertension Mother     Heart Attack Maternal Grandmother     Colon Cancer Neg

## 2025-04-16 NOTE — OPERATIVE REPORT
COLONOSCOPY REPORT    Noam Pride     1980 Age 45 year old   PCP No primary care provider on file. Endoscopist Ana Thurman MD     Date of procedure: 25    Procedure: Colonoscopy     Pre-operative diagnosis: Screening    Post-operative diagnosis: Tortuous colon, internal hemorrhoids    Medications: MAC    Withdrawal time: 17 minutes    Procedure:  Informed consent was obtained from the patient after the risks of the procedure were discussed, including but not limited to bleeding, perforation, aspiration, infection, or possibility of a missed lesion. After discussions of the risks/benefits and alternatives to this procedure, as well as the planned sedation, the patient was placed in the left lateral decubitus position and begun on continuous blood pressure pulse oximetry and EKG monitoring and this was maintained throughout the procedure. Once an adequate level of sedation was obtained a digital rectal exam was completed. Then the lubricated tip of the Yogxyim-[id number] diagnostic video colonoscope was inserted and advanced without difficulty to the cecum using the CO2 insufflation technique. The cecum was identified by localizing the trifold, the appendix and the ileocecal valve. Withdrawal was begun with thorough washing and careful examination of the colonic walls and folds. A routine second examination of the cecum/ascending colon was performed. Photodocumentation was obtained. The bowel prep was fair. Views of the colon were fair with washing. I then carefully withdrew the instrument from the patient who tolerated the procedure well.     Complications: none.    Findings:   1. NO polyp(s) noted.    2. Diverticulosis: none.    3. Terminal ileum: the visualized mucosa appeared normal.    4. The colonic mucosa throughout the colon showed normal vascular pattern, without evidence of angioectasias or inflammation.     5. A retroflexed view of the rectum revealed small internal  hemorrhoids.    6. CHIARA: normal rectal tone, no masses palpated.     Impression:   Normal colonoscopy to the terminal ileum, other than small internal hemorrhoids.   Tortuous colon, fair prep.    Recommend:  Repeat CLN in 5 years given fair prep. If new signs or symptoms develop, colonoscopy may need to be repeated sooner.   Recommend enhanced bowel prep for next colonoscopy (5 days of dulcolax + bowel prep). Patient should be reminded NOT to consume seeds, nuts, corn, popcorn or raw fruits/vegetables for 5 days prior to next colonoscopy.     Specimens: none  Blood loss: <1 ml

## 2025-04-16 NOTE — DISCHARGE INSTRUCTIONS
Home Care Instructions for Colonoscopy  Diet:  - Resume your regular diet as tolerated unless otherwise instructed.  - Start with light meals to minimize bloating.  - Do not drink alcohol today.    Medication:  - If you have questions about resuming your normal medications, please contact your Primary Care Physician.    Activities:  - Take it easy today. Do not return to work today.  - Do not drive today.  - Do not operate any machinery today (including kitchen equipment).    Colonoscopy:  - You may notice some rectal \"spotting\" (a little blood on the toilet tissue) for a day or two after the exam. This is normal.  - If you experience any rectal bleeding (not spotting), persistent tenderness or sharp severe abdominal pains, oral temperature over 100 degrees Fahrenheit, light-headedness or dizziness, or any other problems, contact your doctor.      **If unable to reach your doctor, please go to the [facility] Emergency Room**    - Your referring physician will receive a full report of your examination.  - If you do not hear from your doctor's office within two weeks of your biopsy, please call them for your results.    You may be able to see your laboratory results in [facility] between 4 and 7 business days.  In some cases, your physician may not have viewed the results before they are released to [facility].  If you have questions regarding your results contact the physician who ordered the test/exam by phone or via [facility] by choosing \"Ask a Medical Question.\"

## 2025-05-04 ENCOUNTER — TELEPHONE (OUTPATIENT)
Facility: CLINIC | Age: 45
End: 2025-05-04

## 2025-05-05 NOTE — TELEPHONE ENCOUNTER
Health Maintenance updated.    5 year colonoscopy recall entered into patient outreach in [facility].  Next due 4/16/2030.

## (undated) DEVICE — CO2 CANNULA,SSOFT,ADLT,7O2,4CO2,FEMALE: Brand: MEDLINE

## (undated) DEVICE — MEDI-VAC NON-CONDUCTIVE SUCTION TUBING 6MM X 1.8M (6FT.) L: Brand: CARDINAL HEALTH

## (undated) DEVICE — 60 ML SYRINGE REGULAR TIP: Brand: MONOJECT

## (undated) DEVICE — KIT CLEAN ENDOKIT 1.1OZ GOWNX2

## (undated) DEVICE — KIT ENDO ORCAPOD 160/180/190

## (undated) DEVICE — V2 SPECIMEN COLLECTION MANIFOLD KIT: Brand: NEPTUNE

## (undated) DEVICE — Device

## (undated) NOTE — LETTER
[facility]  Administration of Anesthesia  I, Noam Pride agree to be cared for by a physician anesthesiologist alone and/or with a nurse anesthetist, who is specially trained to monitor me and give me medicine to put me to sleep or keep me comfortable during my procedure    I understand that my anesthesiologist and/or anesthetist is not an employee or agent of [facility] or [email or web address]. He or she works for [facility]    As the patient asking for anesthesia services, I agree to:  Allow the anesthesiologist (anesthesia doctor) to give me medicine and do additional procedures as necessary. Some examples are: Starting or using an “IV” to give me medicine, fluids or blood during my procedure, and having a breathing tube placed to help me breathe when I’m asleep (intubation). In the event that my heart stops working properly, I understand that my anesthesiologist will make every effort to sustain my life, unless otherwise directed by [facility] Do Not Resuscitate documents.  Tell my anesthesia doctor before my procedure:  If I am pregnant.  The last time that I ate or drank.  iii. All of the medicines I take (including prescriptions, herbal supplements, and pills I can buy without a prescription (including street drugs/illegal medications). Failure to inform my anesthesiologist about these medicines may increase my risk of anesthetic complications.  iv.If I am allergic to anything or have had a reaction to anesthesia before.  I understand how the anesthesia medicine will help me (benefits).  I understand that with any type of anesthesia medicine there are risks:  The most common risks are: nausea, vomiting, sore throat, muscle soreness, damage to my eyes, mouth, or teeth (from breathing tube placement).  Rare risks include: remembering what happened during my procedure, allergic reactions to medications, injury to my airway, heart, lungs, vision, nerves, or  muscles and in extremely rare instances death.  My doctor has explained to me other choices available to me for my care (alternatives).  Pregnant Patients (“epidural”):  I understand that the risks of having an epidural (medicine given into my back to help control pain during labor), include itching, low blood pressure, difficulty urinating, headache or slowing of the baby’s heart. Very rare risks include infection, bleeding, seizure, irregular heart rhythms and nerve injury.  Regional Anesthesia (“spinal”, “epidural”, & “nerve blocks”):  I understand that rare but potential complications include headache, bleeding, infection, seizure, irregular heart rhythms, and nerve injury.    _____________________________________________________________________________  Patient (or Representative) Signature/Relationship to Patient  Date   Time    _____________________________________________________________________________   Name (if used)    Language/Organization   Time    _____________________________________________________________________________  Nurse Anesthetist Signature     Date   Time  _____________________________________________________________________________  Anesthesiologist Signature     Date   Time  I have discussed the procedure and information above with the patient (or patient’s representative) and answered their questions. The patient or their representative has agreed to have anesthesia services.    _____________________________________________________________________________  Witness        Date   Time  I have verified that the signature is that of the patient or patient’s representative, and that it was signed before the procedure  Patient Name: Noam Pride     : 1980                 Printed: 12/10/2024 at 8:05 AM    Medical Record #: [id number]                                            Page 1 of 1  ----------ANESTHESIA CONSENT----------

## (undated) NOTE — LETTER
[facility], [facility]  [address]  [facility] [address] [phone]           To Whom It May Concern:    Noam Pride, date of birth 1980, is currently under my medical care. His being treated for adult attention deficit disorder.  He has a history of having gastri bypass surgery (gastroplasty duodenal switch) at [facility] in [place] by Dr Steven Monahan.   He is unable to take any extended release tablets due to this surgery. Bariatric surgery can affect drug absorption due to the loss of mucosal exposure. Bypassing a portion of the small intestine can also alter drug metabolism as it is one of the body's sites of first-pass metabolism due to its large amount of CY enzymes.    Please let me know if you need any further information.     Sincerely,    FLORENCE Solo, FNP-C

## (undated) NOTE — LETTER
[facility]  [address], [place], [place]    Authorization for Surgical Operation and Procedure                               I hereby authorize GREGORY Thurman MD, my physician and his/her assistants (if applicable), which may include medical students, residents, and/or fellows, to perform the following surgical operation/ procedure and administer such anesthesia as may be determined necessary by my physician: Operation/Procedure name (s) COLONOSCOPY on Noam Pride   2.   I recognize that during the surgical operation/procedure, unforeseen conditions may necessitate additional or different procedures than those listed above.  I, therefore, further authorize and request that the above-named surgeon, assistants, or designees perform such procedures as are, in their judgment, necessary and desirable.    3.   My surgeon/physician has discussed prior to my surgery the potential benefits, risks and side effects of this procedure; the likelihood of achieving goals; and potential problems that might occur during recuperation.  They also discussed reasonable alternatives to the procedure, including risks, benefits, and side effects related to the alternatives and risks related to not receiving this procedure.  I have had all my questions answered and I acknowledge that no guarantee has been made as to the result that may be obtained.    4.   Should the need arise during my operation/procedure, which includes change of level of care prior to discharge, I also consent to the administration of blood and/or blood products.  Further, I understand that despite careful testing and screening of blood or blood products by collecting agencies, I may still be subject to ill effects as a result of receiving a blood transfusion and/or blood products.  The following are some, but not all, of the potential risks that can occur: fever and allergic reactions, hemolytic reactions, transmission of diseases such as  Hepatitis, AIDS and Cytomegalovirus (CMV) and fluid overload.  In the event that I wish to have an autologous transfusion of my own blood, or a directed donor transfusion, I will discuss this with my physician.  Check only if Refusing Blood or Blood Products  I understand refusal of blood or blood products as deemed necessary by my physician may have serious consequences to my condition to include possible death. I hereby assume responsibility for my refusal and release the hospital, its personnel, and my physicians from any responsibility for the consequences of my refusal.    o  Refuse   5.   I authorize the use of any specimen, organs, tissues, body parts or foreign objects that may be removed from my body during the operation/procedure for diagnosis, research or teaching purposes and their subsequent disposal by hospital authorities.  I also authorize the release of specimen test results and/or written reports to my treating physician on the hospital medical staff or other referring or consulting physicians involved in my care, at the discretion of the Pathologist or my treating physician.    6.   I consent to the photographing or videotaping of the operations or procedures to be performed, including appropriate portions of my body for medical, scientific, or educational purposes, provided my identity is not revealed by the pictures or by descriptive texts accompanying them.  If the procedure has been photographed/videotaped, the surgeon will obtain the original picture, image, videotape or CD.  The hospital will not be responsible for storage, release or maintenance of the picture, image, tape or CD.    7.   I consent to the presence of a  or observers in the operating room as deemed necessary by my physician or their designees.    8.   I recognize that in the event my procedure results in extended X-Ray/fluoroscopy time, I may develop a skin reaction.    9. If I have a Do Not Attempt  Resuscitation (DNAR) order in place, that status will be suspended while in the operating room, procedural suite, and during the recovery period unless otherwise explicitly stated by me (or a person authorized to consent on my behalf). The surgeon or my attending physician will determine when the applicable recovery period ends for purposes of reinstating the DNAR order.  10. Patients having a sterilization procedure: I understand that if the procedure is successful the results will be permanent and it will therefore be impossible for me to inseminate, conceive, or bear children.  I also understand that the procedure is intended to result in sterility, although the result has not been guaranteed.   11. I acknowledge that my physician has explained sedation/analgesia administration to me including the risk and benefits I consent to the administration of sedation/analgesia as may be necessary or desirable in the judgment of my physician.    I CERTIFY THAT I HAVE READ AND FULLY UNDERSTAND THE ABOVE CONSENT TO OPERATION and/or OTHER PROCEDURE.     ____________________________________  _________________________________        ______________________________  Signature of Patient    Signature of Responsible Person                Printed Name of Responsible Person                                      ____________________________________  _____________________________                ________________________________  Signature of Witness        Date  Time         Relationship to Patient    STATEMENT OF PHYSICIAN My signature below affirms that prior to the time of the procedure; I have explained to the patient and/or his/her legal representative, the risks and benefits involved in the proposed treatment and any reasonable alternative to the proposed treatment. I have also explained the risks and benefits involved in refusal of the proposed treatment and alternatives to the proposed treatment and have answered the patient's  questions. If I have a significant financial interest in a co-management agreement or a significant financial interest in any product or implant, or other significant relationship used in this procedure/surgery, I have disclosed this and had a discussion with my patient.     _____________________________________________________              _____________________________  (Signature of Physician)                                                                                         (Date)                                   (Time)  Patient Name: Noam Pride      : 1980      Printed: 4/15/2025     Medical Record #: [id number]                                      Page 1 of 1

## (undated) NOTE — LETTER
[facility]  [address], [place], [place]    Authorization for Surgical Operation and Procedure                               I hereby authorize GREGORY Thurman MD, my physician and his/her assistants (if applicable), which may include medical students, residents, and/or fellows, to perform the following surgical operation/ procedure and administer such anesthesia as may be determined necessary by my physician: Operation/Procedure name (s) COLONOSCOPY on Noam Pride   2.   I recognize that during the surgical operation/procedure, unforeseen conditions may necessitate additional or different procedures than those listed above.  I, therefore, further authorize and request that the above-named surgeon, assistants, or designees perform such procedures as are, in their judgment, necessary and desirable.    3.   My surgeon/physician has discussed prior to my surgery the potential benefits, risks and side effects of this procedure; the likelihood of achieving goals; and potential problems that might occur during recuperation.  They also discussed reasonable alternatives to the procedure, including risks, benefits, and side effects related to the alternatives and risks related to not receiving this procedure.  I have had all my questions answered and I acknowledge that no guarantee has been made as to the result that may be obtained.    4.   Should the need arise during my operation/procedure, which includes change of level of care prior to discharge, I also consent to the administration of blood and/or blood products.  Further, I understand that despite careful testing and screening of blood or blood products by collecting agencies, I may still be subject to ill effects as a result of receiving a blood transfusion and/or blood products.  The following are some, but not all, of the potential risks that can occur: fever and allergic reactions, hemolytic reactions, transmission of diseases such as  Hepatitis, AIDS and Cytomegalovirus (CMV) and fluid overload.  In the event that I wish to have an autologous transfusion of my own blood, or a directed donor transfusion, I will discuss this with my physician.  Check only if Refusing Blood or Blood Products  I understand refusal of blood or blood products as deemed necessary by my physician may have serious consequences to my condition to include possible death. I hereby assume responsibility for my refusal and release the hospital, its personnel, and my physicians from any responsibility for the consequences of my refusal.    o  Refuse   5.   I authorize the use of any specimen, organs, tissues, body parts or foreign objects that may be removed from my body during the operation/procedure for diagnosis, research or teaching purposes and their subsequent disposal by hospital authorities.  I also authorize the release of specimen test results and/or written reports to my treating physician on the hospital medical staff or other referring or consulting physicians involved in my care, at the discretion of the Pathologist or my treating physician.    6.   I consent to the photographing or videotaping of the operations or procedures to be performed, including appropriate portions of my body for medical, scientific, or educational purposes, provided my identity is not revealed by the pictures or by descriptive texts accompanying them.  If the procedure has been photographed/videotaped, the surgeon will obtain the original picture, image, videotape or CD.  The hospital will not be responsible for storage, release or maintenance of the picture, image, tape or CD.    7.   I consent to the presence of a  or observers in the operating room as deemed necessary by my physician or their designees.    8.   I recognize that in the event my procedure results in extended X-Ray/fluoroscopy time, I may develop a skin reaction.    9. If I have a Do Not Attempt  Resuscitation (DNAR) order in place, that status will be suspended while in the operating room, procedural suite, and during the recovery period unless otherwise explicitly stated by me (or a person authorized to consent on my behalf). The surgeon or my attending physician will determine when the applicable recovery period ends for purposes of reinstating the DNAR order.  10. Patients having a sterilization procedure: I understand that if the procedure is successful the results will be permanent and it will therefore be impossible for me to inseminate, conceive, or bear children.  I also understand that the procedure is intended to result in sterility, although the result has not been guaranteed.   11. I acknowledge that my physician has explained sedation/analgesia administration to me including the risk and benefits I consent to the administration of sedation/analgesia as may be necessary or desirable in the judgment of my physician.    I CERTIFY THAT I HAVE READ AND FULLY UNDERSTAND THE ABOVE CONSENT TO OPERATION and/or OTHER PROCEDURE.     ____________________________________  _________________________________        ______________________________  Signature of Patient    Signature of Responsible Person                Printed Name of Responsible Person                                      ____________________________________  _____________________________                ________________________________  Signature of Witness        Date  Time         Relationship to Patient    STATEMENT OF PHYSICIAN My signature below affirms that prior to the time of the procedure; I have explained to the patient and/or his/her legal representative, the risks and benefits involved in the proposed treatment and any reasonable alternative to the proposed treatment. I have also explained the risks and benefits involved in refusal of the proposed treatment and alternatives to the proposed treatment and have answered the patient's  questions. If I have a significant financial interest in a co-management agreement or a significant financial interest in any product or implant, or other significant relationship used in this procedure/surgery, I have disclosed this and had a discussion with my patient.     _____________________________________________________              _____________________________  (Signature of Physician)                                                                                         (Date)                                   (Time)  Patient Name: Noam Pride      : 1980      Printed: 12/10/2024     Medical Record #: [id number]                                      Page 1 of 1

## (undated) NOTE — LETTER
[facility]  Administration of Anesthesia  I, Noam Pride agree to be cared for by a physician anesthesiologist alone and/or with a nurse anesthetist, who is specially trained to monitor me and give me medicine to put me to sleep or keep me comfortable during my procedure    I understand that my anesthesiologist and/or anesthetist is not an employee or agent of [facility] or [employer]. He or she works for [facility]    As the patient asking for anesthesia services, I agree to:  Allow the anesthesiologist (anesthesia doctor) to give me medicine and do additional procedures as necessary. Some examples are: Starting or using an “IV” to give me medicine, fluids or blood during my procedure, and having a breathing tube placed to help me breathe when I’m asleep (intubation). In the event that my heart stops working properly, I understand that my anesthesiologist will make every effort to sustain my life, unless otherwise directed by [facility] Do Not Resuscitate documents.  Tell my anesthesia doctor before my procedure:  If I am pregnant.  The last time that I ate or drank.  iii. All of the medicines I take (including prescriptions, herbal supplements, and pills I can buy without a prescription (including street drugs/illegal medications). Failure to inform my anesthesiologist about these medicines may increase my risk of anesthetic complications.  iv.If I am allergic to anything or have had a reaction to anesthesia before.  I understand how the anesthesia medicine will help me (benefits).  I understand that with any type of anesthesia medicine there are risks:  The most common risks are: nausea, vomiting, sore throat, muscle soreness, damage to my eyes, mouth, or teeth (from breathing tube placement).  Rare risks include: remembering what happened during my procedure, allergic reactions to medications, injury to my airway, heart, lungs, vision, nerves, or  muscles and in extremely rare instances death.  My doctor has explained to me other choices available to me for my care (alternatives).  Pregnant Patients (“epidural”):  I understand that the risks of having an epidural (medicine given into my back to help control pain during labor), include itching, low blood pressure, difficulty urinating, headache or slowing of the baby’s heart. Very rare risks include infection, bleeding, seizure, irregular heart rhythms and nerve injury.  Regional Anesthesia (“spinal”, “epidural”, & “nerve blocks”):  I understand that rare but potential complications include headache, bleeding, infection, seizure, irregular heart rhythms, and nerve injury.    _____________________________________________________________________________  Patient (or Representative) Signature/Relationship to Patient  Date   Time    _____________________________________________________________________________   Name (if used)    Language/Organization   Time    _____________________________________________________________________________  Nurse Anesthetist Signature     Date   Time  _____________________________________________________________________________  Anesthesiologist Signature     Date   Time  I have discussed the procedure and information above with the patient (or patient’s representative) and answered their questions. The patient or their representative has agreed to have anesthesia services.    _____________________________________________________________________________  Witness        Date   Time  I have verified that the signature is that of the patient or patient’s representative, and that it was signed before the procedure  Patient Name: Noam Pride     : 1980                 Printed: 4/15/2025 at 7:06 AM    Medical Record #: [id number]                                            Page 1 of 1  ----------ANESTHESIA CONSENT----------